# Patient Record
Sex: MALE | Race: WHITE | Employment: FULL TIME | URBAN - NONMETROPOLITAN AREA
[De-identification: names, ages, dates, MRNs, and addresses within clinical notes are randomized per-mention and may not be internally consistent; named-entity substitution may affect disease eponyms.]

---

## 2018-02-28 ENCOUNTER — OFFICE VISIT (OUTPATIENT)
Dept: FAMILY MEDICINE CLINIC | Facility: CLINIC | Age: 53
End: 2018-02-28

## 2018-02-28 VITALS
WEIGHT: 228.38 LBS | TEMPERATURE: 98 F | BODY MASS INDEX: 33.07 KG/M2 | HEIGHT: 69.5 IN | OXYGEN SATURATION: 98 % | HEART RATE: 88 BPM | SYSTOLIC BLOOD PRESSURE: 124 MMHG | DIASTOLIC BLOOD PRESSURE: 88 MMHG

## 2018-02-28 DIAGNOSIS — F41.9 ANXIETY AND DEPRESSION: ICD-10-CM

## 2018-02-28 DIAGNOSIS — I10 ESSENTIAL HYPERTENSION: Primary | ICD-10-CM

## 2018-02-28 DIAGNOSIS — F32.A ANXIETY AND DEPRESSION: ICD-10-CM

## 2018-02-28 PROCEDURE — 99213 OFFICE O/P EST LOW 20 MIN: CPT | Performed by: FAMILY MEDICINE

## 2018-02-28 RX ORDER — LISINOPRIL 10 MG/1
10 TABLET ORAL DAILY
Qty: 90 TABLET | Refills: 3 | Status: SHIPPED | OUTPATIENT
Start: 2018-02-28 | End: 2019-03-27

## 2018-02-28 RX ORDER — ALPRAZOLAM 0.25 MG/1
0.25 TABLET ORAL EVERY 6 HOURS PRN
Qty: 30 TABLET | Refills: 0 | Status: SHIPPED | OUTPATIENT
Start: 2018-02-28 | End: 2019-02-11

## 2018-02-28 NOTE — PROGRESS NOTES
Prasad Webb is a 50year old male. Patient presents with: Other: fup on meds--needs lisinopril refilled--has been out for a day--BP check. ... [facility] room 1      HPI:   Patient lost his insurance and has not been here for close to a year and a half.   He has Comment: 3-4 beers per week max       REVIEW OF SYSTEMS:   GENERAL HEALTH: feels well otherwise  SKIN: denies any unusual skin lesions or rashes  RESPIRATORY: denies shortness of breath   CARDIOVASCULAR: denies chest pain   GI: denies nausea,

## 2018-03-28 ENCOUNTER — NURSE ONLY (OUTPATIENT)
Dept: FAMILY MEDICINE CLINIC | Facility: CLINIC | Age: 53
End: 2018-03-28

## 2018-03-28 ENCOUNTER — TELEPHONE (OUTPATIENT)
Dept: FAMILY MEDICINE CLINIC | Facility: CLINIC | Age: 53
End: 2018-03-28

## 2018-03-28 VITALS — SYSTOLIC BLOOD PRESSURE: 130 MMHG | DIASTOLIC BLOOD PRESSURE: 88 MMHG

## 2018-07-04 ENCOUNTER — HOSPITAL ENCOUNTER (OUTPATIENT)
Age: 53
Discharge: HOME | End: 2018-07-04
Attending: EMERGENCY MEDICINE
Payer: COMMERCIAL

## 2018-07-04 VITALS
HEIGHT: 70 IN | TEMPERATURE: 98 F | SYSTOLIC BLOOD PRESSURE: 134 MMHG | OXYGEN SATURATION: 97 % | HEART RATE: 66 BPM | RESPIRATION RATE: 14 BRPM | WEIGHT: 225 LBS | DIASTOLIC BLOOD PRESSURE: 92 MMHG | BODY MASS INDEX: 32.21 KG/M2

## 2018-07-04 DIAGNOSIS — L23.7 POISON IVY DERMATITIS: Primary | ICD-10-CM

## 2018-07-04 PROCEDURE — 99204 OFFICE O/P NEW MOD 45 MIN: CPT

## 2018-07-04 PROCEDURE — 99213 OFFICE O/P EST LOW 20 MIN: CPT

## 2018-07-04 RX ORDER — PREDNISONE 10 MG/1
TABLET ORAL
Qty: 45 TABLET | Refills: 0 | Status: SHIPPED | OUTPATIENT
Start: 2018-07-04 | End: 2018-07-16 | Stop reason: ALTCHOICE

## 2018-07-04 NOTE — ED INITIAL ASSESSMENT (HPI)
Monday helped a friend clear poison ivy. States since then rash to bilat arms and now the face.  States has never had a reaction in the past.

## 2018-07-04 NOTE — ED PROVIDER NOTES
Patient presents with:  Rash    HPI:     Sanjay Walden is a 50year old male who presents with chief complaint of rash. 2 days ago pt helped a friend clear a lot of poison ivy out from their yard. States he has never had a reaction to it.   Later that e worsen      All results reviewed and discussed with patient. See AVS for detailed discharge instructions.

## 2018-07-16 ENCOUNTER — OFFICE VISIT (OUTPATIENT)
Dept: FAMILY MEDICINE CLINIC | Facility: CLINIC | Age: 53
End: 2018-07-16

## 2018-07-16 VITALS
DIASTOLIC BLOOD PRESSURE: 84 MMHG | TEMPERATURE: 98 F | HEART RATE: 79 BPM | BODY MASS INDEX: 34 KG/M2 | WEIGHT: 239 LBS | SYSTOLIC BLOOD PRESSURE: 136 MMHG | OXYGEN SATURATION: 97 %

## 2018-07-16 DIAGNOSIS — L23.7 POISON IVY: Primary | ICD-10-CM

## 2018-07-16 DIAGNOSIS — R10.33 PERIUMBILICAL ABDOMINAL PAIN: ICD-10-CM

## 2018-07-16 PROCEDURE — 99214 OFFICE O/P EST MOD 30 MIN: CPT | Performed by: FAMILY MEDICINE

## 2018-07-16 RX ORDER — PREDNISONE 20 MG/1
TABLET ORAL
Qty: 18 TABLET | Refills: 0 | Status: SHIPPED | OUTPATIENT
Start: 2018-07-16 | End: 2019-02-05 | Stop reason: ALTCHOICE

## 2018-07-16 NOTE — PROGRESS NOTES
Nat Lau is a 50year old male. Patient presents with: Other: fup from  on 07/04--for poison ivy--took round of prednisone, finished with that now. ...room 2      HPI:   Patient was treated for poison ivy at the urgent care center on July 4.   He Heart Attack Father    • Diabetes Father    • Heart Disorder Father    • Other[other] [OTHER] Father    • Heart Disease Father    • Diabetes Brother    • Hypertension Brother    • Other[other] [OTHER] Brother    • Cancer Brother      CA thyroid   • Diabete Refills for this Visit:  Signed Prescriptions Disp Refills    predniSONE 20 MG Oral Tab 18 tablet 0      Si po TID x 3 days then 1 Po BID x 3 days then 1 po q am x 3 days           Imaging & Consults:  SURGERY - INTERNAL

## 2019-02-05 ENCOUNTER — OFFICE VISIT (OUTPATIENT)
Dept: FAMILY MEDICINE CLINIC | Facility: CLINIC | Age: 54
End: 2019-02-05

## 2019-02-05 VITALS
TEMPERATURE: 98 F | HEART RATE: 74 BPM | HEIGHT: 69.5 IN | DIASTOLIC BLOOD PRESSURE: 88 MMHG | BODY MASS INDEX: 31.27 KG/M2 | SYSTOLIC BLOOD PRESSURE: 128 MMHG | OXYGEN SATURATION: 99 % | WEIGHT: 216 LBS

## 2019-02-05 DIAGNOSIS — B35.4 TINEA CORPORIS: Primary | ICD-10-CM

## 2019-02-05 PROCEDURE — 99213 OFFICE O/P EST LOW 20 MIN: CPT | Performed by: FAMILY MEDICINE

## 2019-02-05 RX ORDER — KETOCONAZOLE 20 MG/G
1 CREAM TOPICAL 2 TIMES DAILY
Qty: 30 G | Refills: 0 | Status: SHIPPED | OUTPATIENT
Start: 2019-02-05 | End: 2020-08-19 | Stop reason: ALTCHOICE

## 2019-02-05 NOTE — PROGRESS NOTES
Link Reynoso is a 48year old male. Patient presents with: Other: LEFT LEG WOUND CHECK INRM . 1       HPI:   Patient developed a ringlike rash to his left anterior lower leg approximately 2 weeks ago. Did not seem to be spreading.   It was not itchy o nausea, vomiting, diarrhea or abdominal pain   NEURO: denies headaches    EXAM:   /88 (BP Location: Right arm, Patient Position: Sitting, Cuff Size: large)   Pulse 74   Temp 97.5 °F (36.4 °C) (Temporal)   Ht 69.5\"   Wt 216 lb   SpO2 99%   BMI 31.44

## 2019-02-11 ENCOUNTER — OFFICE VISIT (OUTPATIENT)
Dept: FAMILY MEDICINE CLINIC | Facility: CLINIC | Age: 54
End: 2019-02-11

## 2019-02-11 VITALS
TEMPERATURE: 97 F | HEIGHT: 69.5 IN | HEART RATE: 60 BPM | RESPIRATION RATE: 12 BRPM | SYSTOLIC BLOOD PRESSURE: 126 MMHG | DIASTOLIC BLOOD PRESSURE: 84 MMHG | WEIGHT: 216 LBS | BODY MASS INDEX: 31.27 KG/M2

## 2019-02-11 DIAGNOSIS — F41.9 ANXIETY: ICD-10-CM

## 2019-02-11 DIAGNOSIS — L30.9 DERMATITIS: ICD-10-CM

## 2019-02-11 DIAGNOSIS — L03.116 CELLULITIS OF LEFT LOWER EXTREMITY: Primary | ICD-10-CM

## 2019-02-11 PROCEDURE — 99213 OFFICE O/P EST LOW 20 MIN: CPT | Performed by: FAMILY MEDICINE

## 2019-02-11 RX ORDER — ALPRAZOLAM 0.25 MG/1
0.25 TABLET ORAL EVERY 6 HOURS PRN
Qty: 30 TABLET | Refills: 0 | Status: SHIPPED | OUTPATIENT
Start: 2019-02-11 | End: 2020-03-17

## 2019-02-11 RX ORDER — SULFAMETHOXAZOLE AND TRIMETHOPRIM 800; 160 MG/1; MG/1
1 TABLET ORAL 2 TIMES DAILY
Qty: 20 TABLET | Refills: 0 | Status: SHIPPED | OUTPATIENT
Start: 2019-02-11 | End: 2019-02-21

## 2019-02-11 RX ORDER — PREDNISONE 20 MG/1
20 TABLET ORAL 2 TIMES DAILY
Qty: 10 TABLET | Refills: 0 | Status: SHIPPED | OUTPATIENT
Start: 2019-02-11 | End: 2019-02-16

## 2019-02-11 NOTE — PATIENT INSTRUCTIONS
COOL COMPRESSES, ICE MASSAGE PRN  AVOID HOT BATHS / SHOWERS, PAT DRY, DON'T RUB, CUT NAILS SHORT TO AVOID SCRATCHING  BENADRYL 25 MG Q6 PRN ITCHING  Prednisone 20 mg twice daily times 5 days  Bactrim DS twice daily times 10 days  Infection control reviewed

## 2019-02-11 NOTE — PROGRESS NOTES
HPI:    Patient ID: Anival Wood is a 48year old male. Patient presents with:  Rash    Patient seen 2/5/19 by Dr. Zully Dykes for rash on his left lower leg  .   This is felt to be due to tinea corporis and he was started on topical ketoconazole 2% cre Infection control reviewed  Call with an update in the next 48-72 hours    Xanax refilled, may use as needed    Yg Martin. DO Artem, FAAFP    No orders of the defined types were placed in this encounter.       Meds This Visit:  Requested Prescriptions

## 2019-02-14 ENCOUNTER — TELEPHONE (OUTPATIENT)
Dept: FAMILY MEDICINE CLINIC | Facility: CLINIC | Age: 54
End: 2019-02-14

## 2019-02-14 NOTE — TELEPHONE ENCOUNTER
Pt calls with an update on his rash----improving, and his cellulitis---healing well, >50% better, no longer open, redness has lessened---looks more bruised now. Advised to finish abx and call if worse and doesn't resolve completely.

## 2019-02-22 ENCOUNTER — TELEPHONE (OUTPATIENT)
Dept: FAMILY MEDICINE CLINIC | Facility: CLINIC | Age: 54
End: 2019-02-22

## 2019-02-22 NOTE — TELEPHONE ENCOUNTER
You saw this patient back on 2/5/19 and prescribed ketoconazole cream.     The patient advised that the cream was for a wound- this didn't help- so he saw Dr Eneida Andrade.  He was put on an antibiotic nad the prednisone was for the rash     The rash isn't any w

## 2019-02-22 NOTE — TELEPHONE ENCOUNTER
JAVON WANTED TO LET DR KNOW THE WOUND HAS HEALED UP PRETTY NICE, BUT THE RASH HE HAD IS STILL ITCHY, HE WAS WONDERING IF THERE WAS SOMETHING ELSE HE CAN TRY?

## 2019-02-26 ENCOUNTER — OFFICE VISIT (OUTPATIENT)
Dept: FAMILY MEDICINE CLINIC | Facility: CLINIC | Age: 54
End: 2019-02-26

## 2019-02-26 VITALS
SYSTOLIC BLOOD PRESSURE: 138 MMHG | HEART RATE: 81 BPM | DIASTOLIC BLOOD PRESSURE: 86 MMHG | WEIGHT: 217.25 LBS | TEMPERATURE: 98 F | BODY MASS INDEX: 32 KG/M2 | OXYGEN SATURATION: 99 %

## 2019-02-26 DIAGNOSIS — B35.4 TINEA CORPORIS: Primary | ICD-10-CM

## 2019-02-26 PROCEDURE — 99213 OFFICE O/P EST LOW 20 MIN: CPT | Performed by: FAMILY MEDICINE

## 2019-02-26 RX ORDER — LISINOPRIL 10 MG/1
10 TABLET ORAL DAILY
COMMUNITY
End: 2019-10-28

## 2019-02-26 RX ORDER — SULFAMETHOXAZOLE AND TRIMETHOPRIM 800; 160 MG/1; MG/1
1 TABLET ORAL 2 TIMES DAILY
Qty: 10 TABLET | Refills: 0 | Status: SHIPPED | OUTPATIENT
Start: 2019-02-26 | End: 2019-03-03

## 2019-02-26 NOTE — PROGRESS NOTES
Fausto Nascimento is a 48year old male. Patient presents with: Other: fup on rash and LT leg wound. ...room 1      HPI:   Patient's rash to the anterior left lower extremity became secondarily infected. Treated with Bactrim.   He responded very well was co denies shortness of breath   CARDIOVASCULAR: denies chest pain   GI: denies nausea, vomiting, diarrhea or abdominal pain   NEURO: denies headaches    EXAM:   /86   Pulse 81   Temp 97.6 °F (36.4 °C) (Tympanic)   Wt 217 lb 4 oz   SpO2 99%   BMI 31.62 k

## 2019-03-27 RX ORDER — LISINOPRIL 10 MG/1
TABLET ORAL
Qty: 90 TABLET | Refills: 3 | Status: SHIPPED | OUTPATIENT
Start: 2019-03-27 | End: 2020-03-17

## 2019-04-08 ENCOUNTER — TELEPHONE (OUTPATIENT)
Dept: FAMILY MEDICINE CLINIC | Facility: CLINIC | Age: 54
End: 2019-04-08

## 2019-04-08 DIAGNOSIS — R21 RASH: Primary | ICD-10-CM

## 2019-04-08 NOTE — TELEPHONE ENCOUNTER
Not sure about insurance.    You have not seen the patient since 2/26/19 for rash on left lower leg     Calling the patient- na/nm

## 2019-04-09 NOTE — TELEPHONE ENCOUNTER
----- Message from Elliot Galarza sent at 4/9/2019  8:54 AM CDT -----  DR Nevaeh Christina RETURNED A CALL ABOUT A DERMATOLOGIST  [phone]

## 2019-04-09 NOTE — TELEPHONE ENCOUNTER
Calling the patient back-   The rash is on both legs. Also on his tricep/bicep area. It does not appear to be contagious as no one in his family has gotten it.  It does itch a lot     Dr Mami Rivas recommended a dermatologist if it persisted  The patient faina

## 2019-04-09 NOTE — TELEPHONE ENCOUNTER
Calling the patient and gave him the information     [phone]  [address] [place] [id number]

## 2019-04-16 ENCOUNTER — OFFICE VISIT (OUTPATIENT)
Dept: DERMATOLOGY | Age: 54
End: 2019-04-16

## 2019-04-16 ENCOUNTER — PATIENT OUTREACH (OUTPATIENT)
Dept: FAMILY MEDICINE CLINIC | Facility: CLINIC | Age: 54
End: 2019-04-16

## 2019-04-16 DIAGNOSIS — L30.9 ECZEMA, UNSPECIFIED TYPE: Primary | ICD-10-CM

## 2019-04-16 PROCEDURE — 99203 OFFICE O/P NEW LOW 30 MIN: CPT | Performed by: DERMATOLOGY

## 2019-04-16 PROCEDURE — 96372 THER/PROPH/DIAG INJ SC/IM: CPT | Performed by: DERMATOLOGY

## 2019-04-16 RX ORDER — TRIAMCINOLONE ACETONIDE 1 MG/G
OINTMENT TOPICAL
Qty: 454 G | Refills: 1 | Status: SHIPPED | OUTPATIENT
Start: 2019-04-16 | End: 2020-03-17 | Stop reason: SDUPTHER

## 2019-04-16 RX ORDER — TRIAMCINOLONE ACETONIDE 40 MG/ML
80 INJECTION, SUSPENSION INTRA-ARTICULAR; INTRAMUSCULAR ONCE
Status: COMPLETED | OUTPATIENT
Start: 2019-04-16 | End: 2019-04-16

## 2019-04-16 RX ADMIN — TRIAMCINOLONE ACETONIDE 80 MG: 40 INJECTION, SUSPENSION INTRA-ARTICULAR; INTRAMUSCULAR at 10:59

## 2019-10-10 ENCOUNTER — TELEPHONE (OUTPATIENT)
Dept: FAMILY MEDICINE CLINIC | Facility: CLINIC | Age: 54
End: 2019-10-10

## 2019-10-10 NOTE — TELEPHONE ENCOUNTER
JAVON HAS A COUSIN THAT HAS SHINGLES, HE IS ASKING ABOUT THE SHOT. HE WOULD LIKE TO KNOW WHAT IT WOULD COST AND HOW EFFECTIVE IT IS. HE DOES HAVE [employer] INSURANCE.

## 2019-10-28 ENCOUNTER — PATIENT OUTREACH (OUTPATIENT)
Dept: FAMILY MEDICINE CLINIC | Facility: CLINIC | Age: 54
End: 2019-10-28

## 2019-10-28 ENCOUNTER — OFFICE VISIT (OUTPATIENT)
Dept: FAMILY MEDICINE CLINIC | Facility: CLINIC | Age: 54
End: 2019-10-28

## 2019-10-28 VITALS
HEIGHT: 69.5 IN | WEIGHT: 227 LBS | TEMPERATURE: 98 F | BODY MASS INDEX: 32.87 KG/M2 | DIASTOLIC BLOOD PRESSURE: 88 MMHG | SYSTOLIC BLOOD PRESSURE: 138 MMHG | HEART RATE: 94 BPM | OXYGEN SATURATION: 95 %

## 2019-10-28 DIAGNOSIS — J03.90 TONSILLITIS: Primary | ICD-10-CM

## 2019-10-28 PROCEDURE — 99213 OFFICE O/P EST LOW 20 MIN: CPT | Performed by: FAMILY MEDICINE

## 2019-10-28 RX ORDER — AZITHROMYCIN 250 MG/1
TABLET, FILM COATED ORAL
Qty: 6 TABLET | Refills: 0 | Status: SHIPPED | OUTPATIENT
Start: 2019-10-28 | End: 2020-08-19 | Stop reason: ALTCHOICE

## 2019-10-28 NOTE — PROGRESS NOTES
Eva Mead is a 48year old male. Patient presents with:  Sore Throat: fever, body aches, st-started on 10/26-has been taking nyquil, tyelnol. ...room 1      HPI:   Patient claims of sore throat. He has had fever and body aches.   He has had a slight well otherwise  SKIN: denies any unusual skin lesions or rashes  RESPIRATORY: denies shortness of breath   CARDIOVASCULAR: denies chest pain   GI: denies nausea, vomiting, diarrhea or abdominal pain   NEURO: denies headaches    EXAM:   /88   Pulse 94

## 2020-03-17 RX ORDER — LISINOPRIL 10 MG/1
10 TABLET ORAL
Qty: 90 TABLET | Refills: 3 | Status: SHIPPED | OUTPATIENT
Start: 2020-03-17 | End: 2021-06-15

## 2020-03-17 RX ORDER — TRIAMCINOLONE ACETONIDE 1 MG/G
OINTMENT TOPICAL
Qty: 454 G | Refills: 1 | Status: SHIPPED | OUTPATIENT
Start: 2020-03-17

## 2020-03-17 RX ORDER — ALPRAZOLAM 0.25 MG/1
0.25 TABLET ORAL EVERY 6 HOURS PRN
Qty: 30 TABLET | Refills: 0 | Status: SHIPPED | OUTPATIENT
Start: 2020-03-17 | End: 2020-09-01

## 2020-03-17 NOTE — TELEPHONE ENCOUNTER
LISINOPRIL 10 MG Oral Tab   ALPRAZolam 0.25 MG Oral Tab     PLEASE SEND REFILLS TO [facility] IN [place]

## 2020-03-17 NOTE — TELEPHONE ENCOUNTER
LOV  10/28/2019    LAST LAB  12/2/2013    LAST RX  Alprazolam  02/11/2019   (30 tabs, 0 refill)  Lisinopril  03/27/2019   (90 tabs, 3 refill)    Next OV  No future appointments.     PROTOCOL     lisinopril 10 MG Oral Tab              Sig: Take 1 tablet (10

## 2020-03-26 RX ORDER — TRIAMCINOLONE ACETONIDE 1 MG/G
OINTMENT TOPICAL
Qty: 454 G | Refills: 1 | OUTPATIENT
Start: 2020-03-26

## 2020-08-19 ENCOUNTER — LAB ENCOUNTER (OUTPATIENT)
Dept: LAB | Age: 55
End: 2020-08-19
Attending: FAMILY MEDICINE
Payer: COMMERCIAL

## 2020-08-19 ENCOUNTER — OFFICE VISIT (OUTPATIENT)
Dept: FAMILY MEDICINE CLINIC | Facility: CLINIC | Age: 55
End: 2020-08-19

## 2020-08-19 VITALS
HEART RATE: 80 BPM | SYSTOLIC BLOOD PRESSURE: 122 MMHG | TEMPERATURE: 98 F | HEIGHT: 69 IN | OXYGEN SATURATION: 96 % | DIASTOLIC BLOOD PRESSURE: 86 MMHG | BODY MASS INDEX: 35.44 KG/M2 | WEIGHT: 239.25 LBS

## 2020-08-19 DIAGNOSIS — I10 ESSENTIAL HYPERTENSION: ICD-10-CM

## 2020-08-19 DIAGNOSIS — R53.83 OTHER FATIGUE: ICD-10-CM

## 2020-08-19 DIAGNOSIS — Z00.00 PREVENTATIVE HEALTH CARE: Primary | ICD-10-CM

## 2020-08-19 DIAGNOSIS — Z12.11 COLON CANCER SCREENING: ICD-10-CM

## 2020-08-19 DIAGNOSIS — K20.90 ESOPHAGITIS: ICD-10-CM

## 2020-08-19 DIAGNOSIS — R13.10 DYSPHAGIA, UNSPECIFIED TYPE: ICD-10-CM

## 2020-08-19 DIAGNOSIS — F41.9 ANXIETY AND DEPRESSION: ICD-10-CM

## 2020-08-19 DIAGNOSIS — M25.50 ARTHRALGIA, UNSPECIFIED JOINT: ICD-10-CM

## 2020-08-19 DIAGNOSIS — R06.00 EXERTIONAL DYSPNEA: ICD-10-CM

## 2020-08-19 DIAGNOSIS — Z12.5 PROSTATE CANCER SCREENING: ICD-10-CM

## 2020-08-19 DIAGNOSIS — F32.A ANXIETY AND DEPRESSION: ICD-10-CM

## 2020-08-19 LAB
ALBUMIN SERPL-MCNC: 4.2 G/DL (ref 3.4–5)
ALBUMIN/GLOB SERPL: 1.1 {RATIO} (ref 1–2)
ALP LIVER SERPL-CCNC: 103 U/L (ref 45–117)
ALT SERPL-CCNC: 52 U/L (ref 16–61)
ANION GAP SERPL CALC-SCNC: 1 MMOL/L (ref 0–18)
AST SERPL-CCNC: 27 U/L (ref 15–37)
BASOPHILS # BLD AUTO: 0.07 X10(3) UL (ref 0–0.2)
BASOPHILS NFR BLD AUTO: 0.9 %
BILIRUB SERPL-MCNC: 0.7 MG/DL (ref 0.1–2)
BUN BLD-MCNC: 17 MG/DL (ref 7–18)
BUN/CREAT SERPL: 12.4 (ref 10–20)
CALCIUM BLD-MCNC: 9.2 MG/DL (ref 8.5–10.1)
CHLORIDE SERPL-SCNC: 105 MMOL/L (ref 98–112)
CHOLEST SMN-MCNC: 174 MG/DL (ref ?–200)
CO2 SERPL-SCNC: 30 MMOL/L (ref 21–32)
COMPLEXED PSA SERPL-MCNC: 0.7 NG/ML (ref ?–4)
CREAT BLD-MCNC: 1.37 MG/DL (ref 0.7–1.3)
DEPRECATED RDW RBC AUTO: 42 FL (ref 35.1–46.3)
EOSINOPHIL # BLD AUTO: 0.29 X10(3) UL (ref 0–0.7)
EOSINOPHIL NFR BLD AUTO: 3.7 %
ERYTHROCYTE [DISTWIDTH] IN BLOOD BY AUTOMATED COUNT: 13 % (ref 11–15)
GLOBULIN PLAS-MCNC: 3.7 G/DL (ref 2.8–4.4)
GLUCOSE BLD-MCNC: 95 MG/DL (ref 70–99)
HCT VFR BLD AUTO: 50.3 % (ref 39–53)
HDLC SERPL-MCNC: 46 MG/DL (ref 40–59)
HGB BLD-MCNC: 16.9 G/DL (ref 13–17.5)
IMM GRANULOCYTES # BLD AUTO: 0.02 X10(3) UL (ref 0–1)
IMM GRANULOCYTES NFR BLD: 0.3 %
LDLC SERPL CALC-MCNC: 84 MG/DL (ref ?–100)
LYMPHOCYTES # BLD AUTO: 1.91 X10(3) UL (ref 1–4)
LYMPHOCYTES NFR BLD AUTO: 24.1 %
M PROTEIN MFR SERPL ELPH: 7.9 G/DL (ref 6.4–8.2)
MCH RBC QN AUTO: 30 PG (ref 26–34)
MCHC RBC AUTO-ENTMCNC: 33.6 G/DL (ref 31–37)
MCV RBC AUTO: 89.2 FL (ref 80–100)
MONOCYTES # BLD AUTO: 1.05 X10(3) UL (ref 0.1–1)
MONOCYTES NFR BLD AUTO: 13.3 %
NEUTROPHILS # BLD AUTO: 4.57 X10 (3) UL (ref 1.5–7.7)
NEUTROPHILS # BLD AUTO: 4.57 X10(3) UL (ref 1.5–7.7)
NEUTROPHILS NFR BLD AUTO: 57.7 %
NONHDLC SERPL-MCNC: 128 MG/DL (ref ?–130)
OSMOLALITY SERPL CALC.SUM OF ELEC: 283 MOSM/KG (ref 275–295)
PLATELET # BLD AUTO: 211 10(3)UL (ref 150–450)
POTASSIUM SERPL-SCNC: 4.6 MMOL/L (ref 3.5–5.1)
RBC # BLD AUTO: 5.64 X10(6)UL (ref 4.3–5.7)
SODIUM SERPL-SCNC: 136 MMOL/L (ref 136–145)
TRIGL SERPL-MCNC: 219 MG/DL (ref 30–149)
TSI SER-ACNC: 3.19 MIU/ML (ref 0.36–3.74)
VLDLC SERPL CALC-MCNC: 44 MG/DL (ref 0–30)
WBC # BLD AUTO: 7.9 X10(3) UL (ref 4–11)

## 2020-08-19 PROCEDURE — 90715 TDAP VACCINE 7 YRS/> IM: CPT | Performed by: FAMILY MEDICINE

## 2020-08-19 PROCEDURE — 3008F BODY MASS INDEX DOCD: CPT | Performed by: FAMILY MEDICINE

## 2020-08-19 PROCEDURE — 80053 COMPREHEN METABOLIC PANEL: CPT

## 2020-08-19 PROCEDURE — G0438 PPPS, INITIAL VISIT: HCPCS | Performed by: FAMILY MEDICINE

## 2020-08-19 PROCEDURE — 3079F DIAST BP 80-89 MM HG: CPT | Performed by: FAMILY MEDICINE

## 2020-08-19 PROCEDURE — 3074F SYST BP LT 130 MM HG: CPT | Performed by: FAMILY MEDICINE

## 2020-08-19 PROCEDURE — 80061 LIPID PANEL: CPT

## 2020-08-19 PROCEDURE — 87476 LYME DIS DNA AMP PROBE: CPT

## 2020-08-19 PROCEDURE — 99396 PREV VISIT EST AGE 40-64: CPT | Performed by: FAMILY MEDICINE

## 2020-08-19 PROCEDURE — 99214 OFFICE O/P EST MOD 30 MIN: CPT | Performed by: FAMILY MEDICINE

## 2020-08-19 PROCEDURE — 36415 COLL VENOUS BLD VENIPUNCTURE: CPT

## 2020-08-19 PROCEDURE — 85025 COMPLETE CBC W/AUTO DIFF WBC: CPT

## 2020-08-19 PROCEDURE — 96127 BRIEF EMOTIONAL/BEHAV ASSMT: CPT | Performed by: FAMILY MEDICINE

## 2020-08-19 PROCEDURE — 90471 IMMUNIZATION ADMIN: CPT | Performed by: FAMILY MEDICINE

## 2020-08-19 PROCEDURE — 84443 ASSAY THYROID STIM HORMONE: CPT

## 2020-08-19 NOTE — H&P
Mitch Choudhury is a 48year old male who presents for a complete physical exam.     Patient presents with:  CPX: annual physical....room 1      HPI:   Patient presents for a routine physical.  He has a list of concerns.   His list includes headaches, chest esophagitis      Family Status   Relation Status   • Mo    • Fa    • Bro    • Bro (Not Specified)   • NEG (Not Specified)          Social History:  Social History    Tobacco Use      Smoking status: Never Smoker      Smokeless tobac dermatology. Blood pressures well controlled. He needs to work on weight loss and abdominal core strengthening which will help his chronic low back pain.   Regarding exertional dyspnea, I would like him to have a stress echocardiogram.  He had a previous

## 2020-08-24 LAB — BORRELIA SPECIES DNA DETECTION: NOT DETECTED

## 2020-09-01 ENCOUNTER — APPOINTMENT (OUTPATIENT)
Dept: LAB | Facility: HOSPITAL | Age: 55
End: 2020-09-01
Attending: FAMILY MEDICINE
Payer: COMMERCIAL

## 2020-09-01 DIAGNOSIS — R06.00 EXERTIONAL DYSPNEA: ICD-10-CM

## 2020-09-02 LAB — SARS-COV-2 RNA RESP QL NAA+PROBE: NOT DETECTED

## 2020-09-02 RX ORDER — ALPRAZOLAM 0.25 MG/1
0.25 TABLET ORAL EVERY 6 HOURS PRN
Qty: 30 TABLET | Refills: 0 | Status: SHIPPED | OUTPATIENT
Start: 2020-09-02 | End: 2021-02-25

## 2020-09-02 NOTE — TELEPHONE ENCOUNTER
LOV: 08/19/20    LAST LAB: 08/19/20    LAST RX: 03/17/20    Next OV:   Future Appointments   Date Time Provider Marietta Wallis   9/3/2020  8:30 AM Ramos Patino MD [facility] SUB GI   9/4/2020  8:00 AM [facility] CARD STRESS ECHO RM 1 ECARD ECHO [facility]

## 2020-09-03 ENCOUNTER — PATIENT MESSAGE (OUTPATIENT)
Dept: ADMINISTRATIVE | Facility: HOSPITAL | Age: 55
End: 2020-09-03

## 2020-09-03 DIAGNOSIS — Z01.818 PRE-OP TESTING: Primary | ICD-10-CM

## 2020-09-03 NOTE — TELEPHONE ENCOUNTER
Pre- Procedure Screening [facility]   10/18/1965   [id number]     Height: 5'10 Weight: 238 BMI:  Not to exceed 45 kg/m2 There is no height or weight on file to calculate BMI.      Travel / Contact Questions Y YES to any of the above questions, notify the provider for further direction. General Questions Y N   Are you currently experiencing any new GI symptoms?  [] [x]     The patient must answer YES to one of the following 2 questions in order to schedule a YES, notify provider and do NOT schedule. Do you have a daily bowel movement? [x] []     If NO, patient will need Miralax for one week prior to procedure. Do you take any pain medications with codeine derivatives?  [] [x]     If YES, patient will ne COVID testing 48-72 hours before their procedure. They will be called by hospital and given appointment time and location for the testing.  They may also call central scheduling at [phone] to schedule the test. They will only receive one phone call fr

## 2020-09-04 ENCOUNTER — HOSPITAL ENCOUNTER (OUTPATIENT)
Dept: CV DIAGNOSTICS | Facility: HOSPITAL | Age: 55
Discharge: HOME | End: 2020-09-04
Attending: FAMILY MEDICINE
Payer: COMMERCIAL

## 2020-09-04 DIAGNOSIS — R06.00 EXERTIONAL DYSPNEA: ICD-10-CM

## 2020-09-04 PROCEDURE — 93350 STRESS TTE ONLY: CPT | Performed by: FAMILY MEDICINE

## 2020-09-04 PROCEDURE — 93017 CV STRESS TEST TRACING ONLY: CPT | Performed by: FAMILY MEDICINE

## 2020-09-04 PROCEDURE — 93018 CV STRESS TEST I&R ONLY: CPT | Performed by: FAMILY MEDICINE

## 2020-10-12 ENCOUNTER — APPOINTMENT (OUTPATIENT)
Dept: LAB | Age: 55
End: 2020-10-12
Attending: INTERNAL MEDICINE
Payer: COMMERCIAL

## 2020-10-12 DIAGNOSIS — Z01.818 PRE-OP TESTING: ICD-10-CM

## 2020-10-14 PROBLEM — K57.30 DIVERTICULOSIS OF COLON: Status: ACTIVE | Noted: 2020-10-14

## 2020-10-14 PROBLEM — K63.5 POLYP OF COLON: Status: ACTIVE | Noted: 2020-10-14

## 2020-10-14 PROBLEM — K64.8 INTERNAL HEMORRHOIDS: Status: ACTIVE | Noted: 2020-10-14

## 2020-10-14 PROBLEM — Z12.11 SPECIAL SCREENING FOR MALIGNANT NEOPLASM OF COLON: Status: ACTIVE | Noted: 2020-10-14

## 2020-10-14 PROCEDURE — 88305 TISSUE EXAM BY PATHOLOGIST: CPT | Performed by: INTERNAL MEDICINE

## 2021-02-22 ENCOUNTER — TELEPHONE (OUTPATIENT)
Dept: FAMILY MEDICINE CLINIC | Facility: CLINIC | Age: 56
End: 2021-02-22

## 2021-02-22 NOTE — TELEPHONE ENCOUNTER
I spoke with patient. He actually would like to see Dr. Nikki Gill as well. He has some things he'd like to discuss.

## 2021-02-25 ENCOUNTER — OFFICE VISIT (OUTPATIENT)
Dept: FAMILY MEDICINE CLINIC | Facility: CLINIC | Age: 56
End: 2021-02-25

## 2021-02-25 VITALS
HEIGHT: 70 IN | RESPIRATION RATE: 16 BRPM | OXYGEN SATURATION: 97 % | TEMPERATURE: 98 F | BODY MASS INDEX: 34.93 KG/M2 | WEIGHT: 244 LBS | DIASTOLIC BLOOD PRESSURE: 88 MMHG | SYSTOLIC BLOOD PRESSURE: 128 MMHG | HEART RATE: 75 BPM

## 2021-02-25 DIAGNOSIS — F41.9 ANXIETY AND DEPRESSION: ICD-10-CM

## 2021-02-25 DIAGNOSIS — R35.0 BENIGN PROSTATIC HYPERPLASIA WITH URINARY FREQUENCY: ICD-10-CM

## 2021-02-25 DIAGNOSIS — F32.A ANXIETY AND DEPRESSION: ICD-10-CM

## 2021-02-25 DIAGNOSIS — R53.83 OTHER FATIGUE: ICD-10-CM

## 2021-02-25 DIAGNOSIS — M79.7 FIBROMYALGIA: Primary | ICD-10-CM

## 2021-02-25 DIAGNOSIS — N40.1 BENIGN PROSTATIC HYPERPLASIA WITH URINARY FREQUENCY: ICD-10-CM

## 2021-02-25 DIAGNOSIS — I10 ESSENTIAL HYPERTENSION: ICD-10-CM

## 2021-02-25 DIAGNOSIS — R10.2 PELVIC PAIN IN MALE: ICD-10-CM

## 2021-02-25 PROCEDURE — 99214 OFFICE O/P EST MOD 30 MIN: CPT | Performed by: FAMILY MEDICINE

## 2021-02-25 PROCEDURE — 3074F SYST BP LT 130 MM HG: CPT | Performed by: FAMILY MEDICINE

## 2021-02-25 PROCEDURE — 3008F BODY MASS INDEX DOCD: CPT | Performed by: FAMILY MEDICINE

## 2021-02-25 PROCEDURE — 3079F DIAST BP 80-89 MM HG: CPT | Performed by: FAMILY MEDICINE

## 2021-02-25 RX ORDER — FLUOXETINE HYDROCHLORIDE 20 MG/1
20 CAPSULE ORAL DAILY
Qty: 90 CAPSULE | Refills: 3 | Status: SHIPPED | OUTPATIENT
Start: 2021-02-25 | End: 2021-07-28 | Stop reason: CLARIF

## 2021-02-25 RX ORDER — ALPRAZOLAM 0.25 MG/1
0.25 TABLET ORAL EVERY 6 HOURS PRN
Qty: 30 TABLET | Refills: 0 | Status: SHIPPED | OUTPATIENT
Start: 2021-02-25 | End: 2021-06-14

## 2021-02-25 NOTE — PROGRESS NOTES
Alvaro Carolina is a 50year old male. Patient presents with:  Blood Pressure: Room 2- BP discuss issues and fatigue      HPI:   Patient's blood pressure was markedly elevated at his last office visit. Here to recheck today.   He also complains of fatigue Mother    • Heart Attack Father    • Diabetes Father    • Diabetes Brother    • Hypertension Brother    • Cancer Brother         CA thyroid   • Diabetes Brother    • Heart Disorder Brother    • Stroke Brother    • Stroke Neg         Social History:  Social fibromyalgia. We will try Prozac 20 mg every morning. Explained it will take 4 weeks to see any effect. If unable to tolerate he will let me know. Hopefully will get to the point where he does not need the alprazolam at all for sleeping.   As it is, he

## 2021-03-04 ENCOUNTER — PATIENT MESSAGE (OUTPATIENT)
Dept: FAMILY MEDICINE CLINIC | Facility: CLINIC | Age: 56
End: 2021-03-04

## 2021-03-05 NOTE — TELEPHONE ENCOUNTER
From: Link Speaker  To: Ellyn Mcclain DO  Sent: 3/5/2021 10:52 AM CST  Subject: Referral Request    Alexy Hines  I sent the counselor the information.  She said she thinks it would be wise to have a brain scan done from all of the years of h

## 2021-03-05 NOTE — TELEPHONE ENCOUNTER
He had an unremarkable MRI of the brain in 2013. Okay to order CT scan of the brain, plain. Please schedule in Maksim Dolan.

## 2021-03-16 ENCOUNTER — HOSPITAL ENCOUNTER (OUTPATIENT)
Dept: CT IMAGING | Age: 56
Discharge: HOME | End: 2021-03-16
Attending: FAMILY MEDICINE
Payer: COMMERCIAL

## 2021-03-16 DIAGNOSIS — R41.0 MENTAL CONFUSION: ICD-10-CM

## 2021-03-16 DIAGNOSIS — M79.7 FIBROMYALGIA: ICD-10-CM

## 2021-03-16 DIAGNOSIS — F41.9 ANXIETY AND DEPRESSION: ICD-10-CM

## 2021-03-16 DIAGNOSIS — F32.A ANXIETY AND DEPRESSION: ICD-10-CM

## 2021-03-16 PROCEDURE — 70450 CT HEAD/BRAIN W/O DYE: CPT | Performed by: FAMILY MEDICINE

## 2021-06-15 RX ORDER — ALPRAZOLAM 0.25 MG/1
0.25 TABLET ORAL EVERY 6 HOURS PRN
Qty: 30 TABLET | Refills: 0 | Status: SHIPPED | OUTPATIENT
Start: 2021-06-15 | End: 2021-09-01

## 2021-06-15 RX ORDER — FLUOXETINE HYDROCHLORIDE 20 MG/1
20 CAPSULE ORAL DAILY
Qty: 90 CAPSULE | Refills: 3 | OUTPATIENT
Start: 2021-06-15

## 2021-06-15 NOTE — TELEPHONE ENCOUNTER
Last refill: 02/25/21  Qty: 30  W/ 0 refills  Last ov: 02/25/21    Requested Prescriptions     Pending Prescriptions Disp Refills   • ALPRAZolam 0.25 MG Oral Tab 30 tablet 0     Sig: Take 1 tablet (0.25 mg total) by mouth every 6 (six) hours as needed for

## 2021-06-15 NOTE — TELEPHONE ENCOUNTER
Last refill: 02/25/21  Qty: 90  W/ 3 refills  Last ov: 02/25/21    Requested Prescriptions     Pending Prescriptions Disp Refills   • FLUoxetine HCl 20 MG Oral Cap 90 capsule 3     Sig: Take 1 capsule (20 mg total) by mouth daily.      No future appointment

## 2021-07-28 ENCOUNTER — PATIENT MESSAGE (OUTPATIENT)
Dept: FAMILY MEDICINE CLINIC | Facility: CLINIC | Age: 56
End: 2021-07-28

## 2021-07-28 ENCOUNTER — APPOINTMENT (OUTPATIENT)
Dept: ULTRASOUND IMAGING | Facility: HOSPITAL | Age: 56
End: 2021-07-28
Attending: EMERGENCY MEDICINE
Payer: COMMERCIAL

## 2021-07-28 ENCOUNTER — HOSPITAL ENCOUNTER (OUTPATIENT)
Facility: HOSPITAL | Age: 56
Setting detail: OBSERVATION
Discharge: HOME | End: 2021-07-29
Attending: EMERGENCY MEDICINE | Admitting: INTERNAL MEDICINE
Payer: COMMERCIAL

## 2021-07-28 ENCOUNTER — APPOINTMENT (OUTPATIENT)
Dept: GENERAL RADIOLOGY | Facility: HOSPITAL | Age: 56
End: 2021-07-28
Attending: EMERGENCY MEDICINE
Payer: COMMERCIAL

## 2021-07-28 ENCOUNTER — HOSPITAL ENCOUNTER (OUTPATIENT)
Age: 56
Discharge: ANOTHER HOSPITAL | End: 2021-07-28
Payer: COMMERCIAL

## 2021-07-28 ENCOUNTER — TELEPHONE (OUTPATIENT)
Dept: FAMILY MEDICINE CLINIC | Facility: CLINIC | Age: 56
End: 2021-07-28

## 2021-07-28 VITALS
SYSTOLIC BLOOD PRESSURE: 123 MMHG | WEIGHT: 235 LBS | DIASTOLIC BLOOD PRESSURE: 91 MMHG | OXYGEN SATURATION: 98 % | RESPIRATION RATE: 18 BRPM | HEART RATE: 94 BPM | BODY MASS INDEX: 33.64 KG/M2 | HEIGHT: 70 IN

## 2021-07-28 DIAGNOSIS — R07.9 CHEST PAIN OF UNCERTAIN ETIOLOGY: Primary | ICD-10-CM

## 2021-07-28 DIAGNOSIS — R07.9 CHEST PAIN, RULE OUT ACUTE MYOCARDIAL INFARCTION: Primary | ICD-10-CM

## 2021-07-28 LAB
ALBUMIN SERPL-MCNC: 3.8 G/DL (ref 3.4–5)
ALBUMIN/GLOB SERPL: 1 {RATIO} (ref 1–2)
ALP LIVER SERPL-CCNC: 116 U/L
ALT SERPL-CCNC: 66 U/L
ANION GAP SERPL CALC-SCNC: 5 MMOL/L (ref 0–18)
APTT PPP: 27 SECONDS (ref 25.4–36.1)
APTT PPP: 40.4 SECONDS (ref 25.4–36.1)
AST SERPL-CCNC: 32 U/L (ref 15–37)
ATRIAL RATE: 80 BPM
ATRIAL RATE: 83 BPM
ATRIAL RATE: 84 BPM
BASOPHILS # BLD AUTO: 0.04 X10(3) UL (ref 0–0.2)
BASOPHILS NFR BLD AUTO: 0.6 %
BILIRUB SERPL-MCNC: 0.7 MG/DL (ref 0.1–2)
BUN BLD-MCNC: 18 MG/DL (ref 7–18)
BUN/CREAT SERPL: 12.9 (ref 10–20)
CALCIUM BLD-MCNC: 8.5 MG/DL (ref 8.5–10.1)
CHLORIDE SERPL-SCNC: 106 MMOL/L (ref 98–112)
CO2 SERPL-SCNC: 26 MMOL/L (ref 21–32)
CREAT BLD-MCNC: 1.4 MG/DL
D-DIMER: 0.28 UG/ML FEU (ref ?–0.53)
DEPRECATED RDW RBC AUTO: 41.1 FL (ref 35.1–46.3)
EOSINOPHIL # BLD AUTO: 0.33 X10(3) UL (ref 0–0.7)
EOSINOPHIL NFR BLD AUTO: 4.8 %
ERYTHROCYTE [DISTWIDTH] IN BLOOD BY AUTOMATED COUNT: 12.6 % (ref 11–15)
GLOBULIN PLAS-MCNC: 3.8 G/DL (ref 2.8–4.4)
GLUCOSE BLD-MCNC: 156 MG/DL (ref 70–99)
HCT VFR BLD AUTO: 46.3 %
HGB BLD-MCNC: 15.7 G/DL
IMM GRANULOCYTES # BLD AUTO: 0.02 X10(3) UL (ref 0–1)
IMM GRANULOCYTES NFR BLD: 0.3 %
LYMPHOCYTES # BLD AUTO: 1.53 X10(3) UL (ref 1–4)
LYMPHOCYTES NFR BLD AUTO: 22.2 %
M PROTEIN MFR SERPL ELPH: 7.6 G/DL (ref 6.4–8.2)
MCH RBC QN AUTO: 30.1 PG (ref 26–34)
MCHC RBC AUTO-ENTMCNC: 33.9 G/DL (ref 31–37)
MCV RBC AUTO: 88.9 FL
MONOCYTES # BLD AUTO: 0.87 X10(3) UL (ref 0.1–1)
MONOCYTES NFR BLD AUTO: 12.6 %
NEUTROPHILS # BLD AUTO: 4.11 X10 (3) UL (ref 1.5–7.7)
NEUTROPHILS # BLD AUTO: 4.11 X10(3) UL (ref 1.5–7.7)
NEUTROPHILS NFR BLD AUTO: 59.5 %
OSMOLALITY SERPL CALC.SUM OF ELEC: 289 MOSM/KG (ref 275–295)
P AXIS: -4 DEGREES
P AXIS: 23 DEGREES
P AXIS: 9 DEGREES
P-R INTERVAL: 166 MS
P-R INTERVAL: 174 MS
P-R INTERVAL: 178 MS
PLATELET # BLD AUTO: 177 10(3)UL (ref 150–450)
POTASSIUM SERPL-SCNC: 3.9 MMOL/L (ref 3.5–5.1)
Q-T INTERVAL: 364 MS
Q-T INTERVAL: 368 MS
Q-T INTERVAL: 372 MS
QRS DURATION: 86 MS
QRS DURATION: 90 MS
QRS DURATION: 90 MS
QTC CALCULATION (BEZET): 427 MS
QTC CALCULATION (BEZET): 429 MS
QTC CALCULATION (BEZET): 434 MS
R AXIS: -18 DEGREES
R AXIS: -25 DEGREES
R AXIS: -25 DEGREES
RBC # BLD AUTO: 5.21 X10(6)UL
SARS-COV-2 RNA RESP QL NAA+PROBE: NOT DETECTED
SODIUM SERPL-SCNC: 137 MMOL/L (ref 136–145)
T AXIS: 10 DEGREES
T AXIS: 13 DEGREES
T AXIS: 17 DEGREES
TROPONIN I SERPL-MCNC: <0.045 NG/ML (ref ?–0.04)
TROPONIN I SERPL-MCNC: <0.045 NG/ML (ref ?–0.04)
VENTRICULAR RATE: 80 BPM
VENTRICULAR RATE: 83 BPM
VENTRICULAR RATE: 84 BPM
WBC # BLD AUTO: 6.9 X10(3) UL (ref 4–11)

## 2021-07-28 PROCEDURE — 71045 X-RAY EXAM CHEST 1 VIEW: CPT | Performed by: EMERGENCY MEDICINE

## 2021-07-28 PROCEDURE — 4A023N7 MEASUREMENT OF CARDIAC SAMPLING AND PRESSURE, LEFT HEART, PERCUTANEOUS APPROACH: ICD-10-PCS | Performed by: INTERNAL MEDICINE

## 2021-07-28 PROCEDURE — B2111ZZ FLUOROSCOPY OF MULTIPLE CORONARY ARTERIES USING LOW OSMOLAR CONTRAST: ICD-10-PCS | Performed by: INTERNAL MEDICINE

## 2021-07-28 PROCEDURE — 99205 OFFICE O/P NEW HI 60 MIN: CPT | Performed by: PHYSICIAN ASSISTANT

## 2021-07-28 PROCEDURE — 99220 INITIAL OBSERVATION CARE,LEVL III: CPT | Performed by: HOSPITALIST

## 2021-07-28 PROCEDURE — 93000 ELECTROCARDIOGRAM COMPLETE: CPT | Performed by: PHYSICIAN ASSISTANT

## 2021-07-28 PROCEDURE — 76700 US EXAM ABDOM COMPLETE: CPT | Performed by: EMERGENCY MEDICINE

## 2021-07-28 RX ORDER — ONDANSETRON 2 MG/ML
4 INJECTION INTRAMUSCULAR; INTRAVENOUS EVERY 6 HOURS PRN
Status: DISCONTINUED | OUTPATIENT
Start: 2021-07-28 | End: 2021-07-29

## 2021-07-28 RX ORDER — ALPRAZOLAM 0.25 MG/1
0.25 TABLET ORAL EVERY 6 HOURS PRN
Status: DISCONTINUED | OUTPATIENT
Start: 2021-07-28 | End: 2021-07-29

## 2021-07-28 RX ORDER — PANTOPRAZOLE SODIUM 20 MG/1
20 TABLET, DELAYED RELEASE ORAL 2 TIMES DAILY
Status: DISCONTINUED | OUTPATIENT
Start: 2021-07-28 | End: 2021-07-29

## 2021-07-28 RX ORDER — PROCHLORPERAZINE EDISYLATE 5 MG/ML
5 INJECTION INTRAMUSCULAR; INTRAVENOUS EVERY 8 HOURS PRN
Status: DISCONTINUED | OUTPATIENT
Start: 2021-07-28 | End: 2021-07-29

## 2021-07-28 RX ORDER — ASPIRIN 325 MG
325 TABLET, DELAYED RELEASE (ENTERIC COATED) ORAL DAILY
Status: DISCONTINUED | OUTPATIENT
Start: 2021-07-29 | End: 2021-07-29

## 2021-07-28 RX ORDER — HEPARIN SODIUM AND DEXTROSE 10000; 5 [USP'U]/100ML; G/100ML
INJECTION INTRAVENOUS CONTINUOUS
Status: DISCONTINUED | OUTPATIENT
Start: 2021-07-28 | End: 2021-07-29

## 2021-07-28 RX ORDER — HEPARIN SODIUM 5000 [USP'U]/ML
5000 INJECTION INTRAVENOUS; SUBCUTANEOUS ONCE
Status: COMPLETED | OUTPATIENT
Start: 2021-07-28 | End: 2021-07-28

## 2021-07-28 RX ORDER — SODIUM CHLORIDE 9 MG/ML
INJECTION, SOLUTION INTRAVENOUS
Status: COMPLETED | OUTPATIENT
Start: 2021-07-29 | End: 2021-07-29

## 2021-07-28 RX ORDER — PANTOPRAZOLE SODIUM 20 MG/1
20 TABLET, DELAYED RELEASE ORAL
COMMUNITY
End: 2021-08-04

## 2021-07-28 RX ORDER — FLUOXETINE HYDROCHLORIDE 20 MG/1
20 CAPSULE ORAL DAILY
Status: DISCONTINUED | OUTPATIENT
Start: 2021-07-28 | End: 2021-07-28

## 2021-07-28 RX ORDER — HEPARIN SODIUM AND DEXTROSE 10000; 5 [USP'U]/100ML; G/100ML
1000 INJECTION INTRAVENOUS ONCE
Status: COMPLETED | OUTPATIENT
Start: 2021-07-28 | End: 2021-07-28

## 2021-07-28 RX ORDER — LISINOPRIL 10 MG/1
10 TABLET ORAL DAILY
Status: DISCONTINUED | OUTPATIENT
Start: 2021-07-29 | End: 2021-07-29

## 2021-07-28 RX ORDER — ASPIRIN 81 MG/1
324 TABLET, CHEWABLE ORAL ONCE
Status: COMPLETED | OUTPATIENT
Start: 2021-07-28 | End: 2021-07-28

## 2021-07-28 RX ORDER — ACETAMINOPHEN 325 MG/1
650 TABLET ORAL EVERY 6 HOURS PRN
Status: DISCONTINUED | OUTPATIENT
Start: 2021-07-28 | End: 2021-07-29

## 2021-07-28 RX ORDER — ENOXAPARIN SODIUM 100 MG/ML
40 INJECTION SUBCUTANEOUS DAILY
Status: CANCELLED | OUTPATIENT
Start: 2021-07-28

## 2021-07-28 NOTE — ED INITIAL ASSESSMENT (HPI)
Pt sts last night at 8:45pm grabbing/chest tightness with radiation to left axilla to the elbow. Denies SOB. Episode lasted 30-45\". Resolved on own. Sts has had several similar episodes in the past 1 month but haven't been as severe.  Today feeling mild ch

## 2021-07-28 NOTE — ED QUICK NOTES
Assuming care of pt from [facility] AT [place] at this time. Spouse with pt. No distress noted. Pt denies any CP at this time.  Pt pending transport to [address]

## 2021-07-28 NOTE — ED QUICK NOTES
Round on pt. Back from [address]. No distress noted. Pt denies any pain. Informed of plan for adm. Pt denies any needs at this time.

## 2021-07-28 NOTE — ED INITIAL ASSESSMENT (HPI)
Patient arrives from [facility]. Chest pain last night, intermittent today. Denies dyspnea. States pain felt constricting to left side of chest. Last night radiated to neck and upper left arm.

## 2021-07-28 NOTE — ED PROVIDER NOTES
Patient Seen in: Immediate [address] [place] [address]      History   Patient presents with:  Chest Pain    Stated Complaint: chest tightness/heart palpitations    HPI/Subjective:   HPI    Patient is a 79-year-old male presenting immediate care center with chest discomf coronary artery disease. Father  at 67 from an MI, 1 sibling had passed away at the age of 48 but had cardiac disease. Another sibling has cardiac disease with stent placement. Review of Systems   Constitutional: Negative. HENT: Negative.     Eyes Pulmonary effort is normal.      Breath sounds: Normal breath sounds. Chest:      Chest wall: No tenderness. Abdominal:      General: Abdomen is flat. Bowel sounds are normal. There is no distension. Palpations: There is no mass.       Tenderness: MDM      Patient presents to immediate care center with the above complaint. Patient having suspicion for of chest discomfort he is to be further worked up.  The patient does have risk factors for cardiac diseases, he had a normal stress test p

## 2021-07-28 NOTE — H&P
MIKEL HOSPITALIST  History and Physical     On license of [facility] Patient Status:  Emergency    10/18/1965 MRN [id number]   Location [address] [facility] Attending Brittaney Low MD   Hosp Day # 0 PCP Sabrina Langston DO     Chief Compl Diabetes Father    • Diabetes Brother    • Hypertension Brother    • Cancer Brother         CA thyroid   • Diabetes Brother    • Heart Disorder Brother    • Stroke Brother    • Stroke Neg         Allergies:   Oxycontin               ITCHING    Comment:Ment or lesions. Psychiatric: Appropriate mood and affect.       Diagnostic Data:      Labs:  Recent Labs   Lab 07/28/21  1426   WBC 6.9   HGB 15.7   MCV 88.9   .0       Recent Labs   Lab 07/28/21  1426   *   BUN 18   CREATSERUM 1.40*   GFRAA 66

## 2021-07-28 NOTE — ED QUICK NOTES
Orders for admission, patient is aware of plan and ready to go upstairs. Any questions, please call ED RN [phone]    Vaccinated?   Type of COVID test sent:Rapid, negative  COVID Suspicion level: Low      Titratable drug(s) infusing:NA  Rate:    LOC at time of

## 2021-07-28 NOTE — ED PROVIDER NOTES
Patient Seen in: [facility] Emergency Department      History   Patient presents with:  Chest Pain Angina    Stated Complaint: chest pain since last night    HPI/Subjective:   HPI    Jenni Paez is a pleasant 68-year-old male coming with complaints of monroe °F (36.7 °C) (Oral)   Resp 20   Ht 177.8 cm (5' 10\")   Wt 104.3 kg   SpO2 93%   BMI 33.00 kg/m²         Physical Exam    Generally the patient is alert and oriented ×3 and appears in no distress  HEENT exam: Normal  Lungs are clear to auscultation bilater as past medical history and family history is at moderate to high risk. He will be admitted for further evaluation with cardiac consultation obtained through the emergency department.   He does receive aspirin here in the emergency department he remains pa

## 2021-07-28 NOTE — CONSULTS
[facility] SERVICES Cardiovascular [place]  Report of Consultation    Laya Rojas Patient Status:  Emergency    10/18/1965 MRN [id number]   Location [address] [facility] Attending Natalie Mcelroy MD   Hosp Day # 0 PCP Maritza Shannon reports that he does not use drugs.     Allergies:    Oxycontin               ITCHING    Comment:Mental side effects  Inderal [Propranolo*        Comment:Felt depression    Medications:  Heparin Sodium (Porcine) 5000 UNIT/ML injection 5,000 Units, 5,000 Uni Cardiomegaly - hypertensive     Fibromyalgia     Anxiety and depression     Special screening for malignant neoplasm of colon     Polyp of colon     Diverticulosis of colon     Internal hemorrhoids     Chest pain, rule out acute myocardial infarction

## 2021-07-28 NOTE — TELEPHONE ENCOUNTER
Patient feels tightness in the chest going on and off. Last night was more severe. Describes location at mid left chest that radiates to arm pit and inner left arm not down to forearm.  Advised patient that he can not wait until a doctor visit and needs to

## 2021-07-29 ENCOUNTER — APPOINTMENT (OUTPATIENT)
Dept: INTERVENTIONAL RADIOLOGY/VASCULAR | Facility: HOSPITAL | Age: 56
End: 2021-07-29
Attending: INTERNAL MEDICINE
Payer: COMMERCIAL

## 2021-07-29 ENCOUNTER — APPOINTMENT (OUTPATIENT)
Dept: CV DIAGNOSTICS | Facility: HOSPITAL | Age: 56
End: 2021-07-29
Attending: INTERNAL MEDICINE
Payer: COMMERCIAL

## 2021-07-29 VITALS
OXYGEN SATURATION: 93 % | RESPIRATION RATE: 18 BRPM | BODY MASS INDEX: 34.02 KG/M2 | TEMPERATURE: 99 F | HEART RATE: 76 BPM | WEIGHT: 237.63 LBS | HEIGHT: 70 IN | SYSTOLIC BLOOD PRESSURE: 132 MMHG | DIASTOLIC BLOOD PRESSURE: 89 MMHG

## 2021-07-29 LAB
ANION GAP SERPL CALC-SCNC: 4 MMOL/L (ref 0–18)
APTT PPP: 52.2 SECONDS (ref 25.4–36.1)
BUN BLD-MCNC: 19 MG/DL (ref 7–18)
BUN/CREAT SERPL: 14.1 (ref 10–20)
CALCIUM BLD-MCNC: 8.3 MG/DL (ref 8.5–10.1)
CHLORIDE SERPL-SCNC: 107 MMOL/L (ref 98–112)
CHOLEST SMN-MCNC: 137 MG/DL (ref ?–200)
CO2 SERPL-SCNC: 27 MMOL/L (ref 21–32)
CREAT BLD-MCNC: 1.35 MG/DL
DEPRECATED RDW RBC AUTO: 42 FL (ref 35.1–46.3)
ERYTHROCYTE [DISTWIDTH] IN BLOOD BY AUTOMATED COUNT: 12.7 % (ref 11–15)
EST. AVERAGE GLUCOSE BLD GHB EST-MCNC: 146 MG/DL (ref 68–126)
GLUCOSE BLD-MCNC: 148 MG/DL (ref 70–99)
HBA1C MFR BLD HPLC: 6.7 % (ref ?–5.7)
HCT VFR BLD AUTO: 46 %
HDLC SERPL-MCNC: 35 MG/DL (ref 40–59)
HGB BLD-MCNC: 15.2 G/DL
LDLC SERPL CALC-MCNC: 60 MG/DL (ref ?–100)
MCH RBC QN AUTO: 29.9 PG (ref 26–34)
MCHC RBC AUTO-ENTMCNC: 33 G/DL (ref 31–37)
MCV RBC AUTO: 90.4 FL
NONHDLC SERPL-MCNC: 102 MG/DL (ref ?–130)
OSMOLALITY SERPL CALC.SUM OF ELEC: 291 MOSM/KG (ref 275–295)
PLATELET # BLD AUTO: 165 10(3)UL (ref 150–450)
PLATELET # BLD AUTO: 165 10(3)UL (ref 150–450)
POTASSIUM SERPL-SCNC: 3.8 MMOL/L (ref 3.5–5.1)
RBC # BLD AUTO: 5.09 X10(6)UL
SODIUM SERPL-SCNC: 138 MMOL/L (ref 136–145)
TRIGL SERPL-MCNC: 266 MG/DL (ref 30–149)
TROPONIN I SERPL-MCNC: <0.045 NG/ML (ref ?–0.04)
TROPONIN I SERPL-MCNC: <0.045 NG/ML (ref ?–0.04)
VLDLC SERPL CALC-MCNC: 39 MG/DL (ref 0–30)
WBC # BLD AUTO: 6.4 X10(3) UL (ref 4–11)

## 2021-07-29 PROCEDURE — 99217 OBSERVATION CARE DISCHARGE: CPT | Performed by: HOSPITALIST

## 2021-07-29 PROCEDURE — 93306 TTE W/DOPPLER COMPLETE: CPT | Performed by: INTERNAL MEDICINE

## 2021-07-29 RX ORDER — LISINOPRIL 20 MG/1
20 TABLET ORAL DAILY
Qty: 30 TABLET | Refills: 0 | Status: SHIPPED | OUTPATIENT
Start: 2021-07-29 | End: 2021-08-24

## 2021-07-29 RX ORDER — NITROGLYCERIN 20 MG/100ML
INJECTION INTRAVENOUS
Status: COMPLETED
Start: 2021-07-29 | End: 2021-07-29

## 2021-07-29 RX ORDER — LIDOCAINE HYDROCHLORIDE 10 MG/ML
INJECTION, SOLUTION EPIDURAL; INFILTRATION; INTRACAUDAL; PERINEURAL
Status: COMPLETED
Start: 2021-07-29 | End: 2021-07-29

## 2021-07-29 RX ORDER — ROSUVASTATIN CALCIUM 5 MG/1
5 TABLET, COATED ORAL NIGHTLY
Qty: 30 TABLET | Refills: 0 | Status: SHIPPED | OUTPATIENT
Start: 2021-07-29 | End: 2021-09-01

## 2021-07-29 RX ORDER — VERAPAMIL HYDROCHLORIDE 2.5 MG/ML
INJECTION, SOLUTION INTRAVENOUS
Status: COMPLETED
Start: 2021-07-29 | End: 2021-07-29

## 2021-07-29 RX ORDER — SODIUM CHLORIDE 9 MG/ML
INJECTION, SOLUTION INTRAVENOUS CONTINUOUS
Status: ACTIVE | OUTPATIENT
Start: 2021-07-29 | End: 2021-07-29

## 2021-07-29 RX ORDER — MIDAZOLAM HYDROCHLORIDE 1 MG/ML
INJECTION INTRAMUSCULAR; INTRAVENOUS
Status: COMPLETED
Start: 2021-07-29 | End: 2021-07-29

## 2021-07-29 RX ORDER — HEPARIN SODIUM 5000 [USP'U]/ML
INJECTION, SOLUTION INTRAVENOUS; SUBCUTANEOUS
Status: COMPLETED
Start: 2021-07-29 | End: 2021-07-29

## 2021-07-29 NOTE — DISCHARGE SUMMARY
Brian Galvan HOSPITALIST  DISCHARGE SUMMARY     Alvaro Ge Patient Status:  Observation    10/18/1965 MRN [id number]   [facility] 8NE-A Attending Bakari Shah, [address] [facility] Day # 0 PCP Flaco Garcia DO     Date of Admission: 2021  Da · None    Consultants:  • Cardiology    Discharge Medication List:     Discharge Medications      START taking these medications      Instructions Prescription details   metFORMIN HCl 500 MG Tabs  Commonly known as: GLUCOPHAGE      Take 1 tablet (500 mg [14-20] 17  BP: (123-147)/() 134/90    Physical Exam:    General: No acute distress. Obese. Respiratory: Clear to auscultation bilaterally. No wheezes. No rhonchi. Cardiovascular: S1, S2. Regular rate and rhythm. No murmurs, rubs or gallops.    Rodriguez Kelley

## 2021-07-29 NOTE — TELEPHONE ENCOUNTER
From: Alphonse Rosario  To: Olivia Chan DO  Sent: 7/28/2021 9:25 PM CDT  Subject: Referral Request    Hello there,  So, I have found my way to [facility] and they are running more tests tomorrow . ...   I have a couple referrals that I have not tended to

## 2021-07-29 NOTE — PROGRESS NOTES
Prelim angio    Non-obstructive CAD - normal LV function    Home today - I'll see him in [place] office in October for follow up.     Needs to work on weight - blood sugar elevated and fatty liver changes - HgbA1c pending    LDL 60   HDL 35    [place]

## 2021-07-29 NOTE — PROGRESS NOTES
Assumed care at [address]. Patient sitting in bed, denies CP, SOB and dizziness. Heparin gtt infusing per ACS protocol. Patient AOx4; NSR on cardiac monitor. Plan for [facility] tomorrow, consent signed and in chart. Call light with in reach, will continue to monitor.

## 2021-07-29 NOTE — ED QUICK NOTES
Confirmed with Aung Thorpe on CTU 8 that room is clean at this time. Neha Tinajero confirms receiving RN is Carla Foley (ext [phone]). Informed ER is in code surge. Transport paged.

## 2021-07-29 NOTE — PROGRESS NOTES
Patient sitting in chair, denies CP, SOB and dizziness. Heparin gtt infusing per order. Patient AOx4; NSR on cardiac monitor; lungs clear bilaterally ,RA, no cough noted; no edema noted. Patient NPO for LHC.  Call light with in reach, will continue to monit

## 2021-07-29 NOTE — PROGRESS NOTES
07/28/21 1950 07/28/21 1952 07/28/21 1953   Vital Signs   Pulse 80 85 85   BP (!) 136/101 (!) 143/95 (!) 147/93   MAP (mmHg) 109 107 106   BP Location Left arm Left arm Left arm   BP Method Automatic Automatic Automatic   Patient Position Lying Sitting

## 2021-07-29 NOTE — PROGRESS NOTES
Patient tele D/C, IV discontinued with catheter intact. Patient denies CP, SOB, dizziness, or palpitations. Patient denies calf tenderness. Patient discharge instructions reviewed with patient and spouse, verbalize understanding.  Patient medication and the

## 2021-07-29 NOTE — PLAN OF CARE
Assumed care of patient approx 2330. Patient alert and oriented x4. Vital signs stable, NSR on telemetry. On room air. Patient reports Tylenol helped with headache. Heparin gtt infusing as ordered.  Call light within reach, patient and family updated on mesfin

## 2021-07-29 NOTE — PROGRESS NOTES
Reviewed with Dr. Tonja Stevens. No issues overnight.  No CP/SOB this am    ECG's with no ischemic change    Echo pending - stress echo in Sept 2020 normal with good exercise tolerance    Mildly hypertensive    Troponins normal     / HDL 35    Abdominal u

## 2021-07-30 ENCOUNTER — PATIENT OUTREACH (OUTPATIENT)
Dept: CASE MANAGEMENT | Age: 56
End: 2021-07-30

## 2021-07-30 DIAGNOSIS — Z02.9 ENCOUNTERS FOR ADMINISTRATIVE PURPOSE: ICD-10-CM

## 2021-07-30 NOTE — PROGRESS NOTES
Initial Post Discharge Follow Up   Discharge Date: 7/29/21  Contact Date: 7/30/2021    Consent Verification:  Assessment Completed With: Patient  HIPAA Verified?   Yes    Discharge Dx:  Chest pain, rule out acute myocardial infarction    Was TCC ordered: [facility]? yes  • (NCM) If a new medication was prescribed:    o Was the new medication’s purpose & side effects reviewed? yes  o Do you have any questions about your new medication?  No  • Did you  your discharge medications when you left the hospita medications with patient,  and orders reviewed and discussed. Any changes or updates to medications and or orders sent to PCP.

## 2021-07-30 NOTE — PROCEDURES
[facility]    PATIENT'S NAME: Gladis Ellington DONELL   ATTENDING PHYSICIAN: Nish Molina. Luz Marina Chin D.O.   OPERATING PHYSICIAN: Venus Quach M.D.    PATIENT ACCOUNT#:   [de-identified]    LOCATION:  [address] [place] [address]  MEDICAL RECORD #:   [id number]       DATE OF BIRTH ARTERIOGRAPHY:  The coronary circulation was right dominant, and a mild amount of vascular calcification was seen fluoroscopically. The left main coronary artery had no angiographic evidence of significant obstructive atherosclerosis.     The left anteri

## 2021-08-02 PROBLEM — R07.9 CHEST PAIN, RULE OUT ACUTE MYOCARDIAL INFARCTION: Status: RESOLVED | Noted: 2021-07-28 | Resolved: 2021-08-02

## 2021-08-02 PROBLEM — Z12.11 SPECIAL SCREENING FOR MALIGNANT NEOPLASM OF COLON: Status: RESOLVED | Noted: 2020-10-14 | Resolved: 2021-08-02

## 2021-08-04 NOTE — PROGRESS NOTES
Mavis Toure is a 50year old male. Patient presents with:  Hospital F/U: fup from [address] [place] [address] for chest pain-sent my[facility]  Dizziness: dizziness that started June 2021-has gotten worse. ...       HPI:   Patient was hospit Attack Father    • Diabetes Father    • Diabetes Brother    • Hypertension Brother    • Cancer Brother         CA thyroid   • Diabetes Brother    • Heart Disorder Brother    • Stroke Brother    • Stroke Neg         Social History:  Social History    Tobacc Needs to work on [employer] and weight loss. Recheck hemoglobin A1c in 6 months. He will need a microalbumin at that time. Continue the rosuvastatin regardless of the cholesterol. No orders of the defined types were placed in this encounter.       Me

## 2021-08-05 LAB
ATRIAL RATE: 67 BPM
P AXIS: 15 DEGREES
P-R INTERVAL: 176 MS
Q-T INTERVAL: 406 MS
QRS DURATION: 92 MS
QTC CALCULATION (BEZET): 429 MS
R AXIS: -17 DEGREES
T AXIS: 8 DEGREES
VENTRICULAR RATE: 67 BPM

## 2021-08-24 RX ORDER — LISINOPRIL 20 MG/1
20 TABLET ORAL DAILY
Qty: 90 TABLET | Refills: 0 | Status: SHIPPED | OUTPATIENT
Start: 2021-08-24 | End: 2021-11-19

## 2021-08-24 NOTE — TELEPHONE ENCOUNTER
Last office visit:  08/04/21   Last refill:  07/29/21  #30-ordering doc:  Britt Roberts while in hospital  Last bmp:  07/29/21      No future visits.

## 2021-09-01 ENCOUNTER — PATIENT MESSAGE (OUTPATIENT)
Dept: FAMILY MEDICINE CLINIC | Facility: CLINIC | Age: 56
End: 2021-09-01

## 2021-09-01 RX ORDER — ALPRAZOLAM 0.25 MG/1
0.25 TABLET ORAL EVERY 6 HOURS PRN
Qty: 30 TABLET | Refills: 0 | Status: SHIPPED | OUTPATIENT
Start: 2021-09-01

## 2021-09-01 RX ORDER — ROSUVASTATIN CALCIUM 5 MG/1
5 TABLET, COATED ORAL NIGHTLY
Qty: 90 TABLET | Refills: 1 | Status: SHIPPED | OUTPATIENT
Start: 2021-09-01 | End: 2022-02-28

## 2021-09-01 NOTE — TELEPHONE ENCOUNTER
LOV: 08/04/21    LAST LAB: 07/29/21    LAST RX: 07/29/21    Next OV:   Future Appointments   Date Time Provider Marietta Kadi   9/15/2021 10:00 AM MD JERRY Henson  [facility]  Cholesterol Medication Protocol [id number]

## 2021-09-01 NOTE — TELEPHONE ENCOUNTER
From: Misael Banegas  To: Sharonda Rubalcava DO  Sent: 9/1/2021 7:36 AM CDT  Subject: Prescription Question    Refill please. ...

## 2021-09-01 NOTE — TELEPHONE ENCOUNTER
From: Fausto Nascimento  To: Verito Christopher DO  Sent: 9/1/2021 7:33 AM CDT  Subject: Prescription Question    This is the bottle from the first script

## 2021-09-01 NOTE — TELEPHONE ENCOUNTER
LOV: 08/04/21    LAST LAB: 07/29/21    LAST RX: 06/15/21    Next OV:   Future Appointments   Date Time Provider Marietta Wallis   9/15/2021 10:00 AM MD JERRY Ortiz       PROTOCOL: n/a

## 2021-09-15 ENCOUNTER — TELEPHONE (OUTPATIENT)
Dept: NEUROLOGY | Facility: CLINIC | Age: 56
End: 2021-09-15

## 2021-09-15 NOTE — TELEPHONE ENCOUNTER
Pt called to confirm appt for tomorrow, however, he was scheduled for today 9/15/21. Pt apologized and rescheduled for 10/13/21 in [place]. Notified [place] office.

## 2021-10-13 NOTE — PROGRESS NOTES
New patient for mental confusion- Patient states he has been experiencing headaches & mental confusion for a few years. Patient states he has headaches consistently all day. Patient states he takes tylenol for headaches which usually helps.  Patient states

## 2021-10-13 NOTE — PROGRESS NOTES
[facility]   Neurology; INITIAL CLINIC VISIT  10/13/2021, 11:05 AM     Gissell Mo Patient Status:  No patient class for patient encounter    10/18/1965 MRN [id number]   Location [address] Attending No att. [place] Nicole MG/5ML Oral Solution, , Disp: , Rfl:   •  pantoprazole 20 MG Oral Tab EC, Take 1 tablet (20 mg total) by mouth 2 (two) times daily before meals. , Disp: 180 tablet, Rfl: 1  •  ALPRAZolam 0.25 MG Oral Tab, Take 1 tablet (0.25 mg total) by mouth every 6 (six) unremarkable. Visualized portions of the orbits are unremarkable. IMPRESSION:   Mild global parenchymal volume loss is noted.  Sequelae of chronic small vessel ischemic disease is noted.  No evidence of intracranial hemorrhage or extra-axial fluid colle

## 2021-11-17 ENCOUNTER — PATIENT MESSAGE (OUTPATIENT)
Dept: FAMILY MEDICINE CLINIC | Facility: CLINIC | Age: 56
End: 2021-11-17

## 2021-11-17 ENCOUNTER — TELEPHONE (OUTPATIENT)
Dept: FAMILY MEDICINE CLINIC | Facility: CLINIC | Age: 56
End: 2021-11-17

## 2021-11-17 DIAGNOSIS — E11.9 TYPE 2 DIABETES MELLITUS WITHOUT COMPLICATION, WITHOUT LONG-TERM CURRENT USE OF INSULIN (HCC): Primary | ICD-10-CM

## 2021-11-17 NOTE — TELEPHONE ENCOUNTER
Yaron Vann would like a prescription for a glucometer and supplies. He has a coupon for the \"contour next\" brand. Ok to check blood sugar once daily? He is not on insulin. Order pending for review.

## 2021-11-17 NOTE — TELEPHONE ENCOUNTER
Script faxed to [facility]. Also attached the coupon that was provided by the pt on [employer].

## 2021-11-17 NOTE — TELEPHONE ENCOUNTER
Called pt and advised to check blood sugar daily while fasting. Should record results for the next 1-2 weeks and bring in results for Dr. Saritha Duarte to review. Verbalized understanding.

## 2021-11-17 NOTE — TELEPHONE ENCOUNTER
PT CAN GET A GLUCOSE METER THRU [facility] FOR FREE, JUST NEED DR TO SEND ORDER OVER, PT SAID THEY ALREADY HAVE IT KEYED INTO THEIR SYSTEM

## 2021-11-19 ENCOUNTER — PATIENT MESSAGE (OUTPATIENT)
Dept: FAMILY MEDICINE CLINIC | Facility: CLINIC | Age: 56
End: 2021-11-19

## 2021-11-19 RX ORDER — LISINOPRIL 20 MG/1
TABLET ORAL
Qty: 90 TABLET | Refills: 0 | Status: SHIPPED | OUTPATIENT
Start: 2021-11-19

## 2021-11-19 NOTE — TELEPHONE ENCOUNTER
Requested Prescriptions     Pending Prescriptions Disp Refills   • LISINOPRIL 20 MG Oral Tab [Pharmacy Med Name: LISINOPRIL 20MG TABLETS] 90 tablet 0     Sig: TAKE 1 TABLET(20 MG) BY MOUTH DAILY     Last refill #90 on 8/24/2021  Last office visit pertainin

## 2021-12-20 RX ORDER — LISINOPRIL 10 MG/1
TABLET ORAL
Qty: 90 TABLET | Refills: 0 | OUTPATIENT
Start: 2021-12-20

## 2021-12-20 NOTE — TELEPHONE ENCOUNTER
Hypertension Medications Protocol Passed 12/17/2021 05:25 PM   Protocol Details  CMP or BMP in past 12 months    Last serum creatinine< 2.0    Appointment in past 6 or next 3 months        Last office visit:  08/04/21  Last refill:  11/19/21  #90, no refi

## 2022-01-10 RX ORDER — ROSUVASTATIN CALCIUM 5 MG/1
5 TABLET, COATED ORAL NIGHTLY
Qty: 90 TABLET | Refills: 1 | OUTPATIENT
Start: 2022-01-10 | End: 2022-07-09

## 2022-01-10 NOTE — TELEPHONE ENCOUNTER
Requested Prescriptions     Pending Prescriptions Disp Refills   • rosuvastatin 5 MG Oral Tab 90 tablet 1     Sig: Take 1 tablet (5 mg total) by mouth nightly. Last refill #90 x 1 on 9/1/2021  Refill requested too soon  Refill denied.

## 2022-01-13 NOTE — PROGRESS NOTES
[facility] with [address]  10/18/1965  Primary Care Provider:  Maggi Nettles DO    1/13/2022  Accompanied visit:     (x) No.      49year old yo patient being seen for:  Jorden effects  Inderal [Propranolo*        Comment:Felt depression         EXAM:  /82 (BP Location: Left arm, Patient Position: Sitting, Cuff Size: large)   Pulse 88   Resp 16   Ht 70\"   Wt 217 lb (98.4 kg)   BMI 31.14 kg/m²   Looks stated age  General Ex --non F2F  (  ) Independent Historian obtained    Non Face to Face CPT code 21609/77545 applies as documented above    PROCEDURE DONE     (   ) see notes      After visit, patient was escorted out and handed-off discharge process and instructions to the ch

## 2022-02-16 NOTE — TELEPHONE ENCOUNTER
LOV: 08/04/21    LAST LAB: 07/29/21    LAST RX: contour kit ordered in Nov 2021, pt needs refill on strips and lancets    Next OV:   Future Appointments   Date Time Provider Marietta Wallis   4/14/2022 11:40 AM MD LUPE CaoOSKAR [facility]       PROTOCOL:   Hypertension Medications Protocol Failed 02/16/2022 09:42 AM   Protocol Details  Appointment in past 6 or next 3 months    CMP or BMP in past 12 months    Last serum creatinine< 2.0     Left detailed msg for pt advising he is due for OV and labs per last OV note.

## 2022-02-17 RX ORDER — LANCETS
EACH MISCELLANEOUS
Qty: 100 EACH | Refills: 0 | Status: SHIPPED | OUTPATIENT
Start: 2022-02-17

## 2022-02-17 RX ORDER — LISINOPRIL 20 MG/1
TABLET ORAL
Qty: 30 TABLET | Refills: 0 | Status: SHIPPED | OUTPATIENT
Start: 2022-02-17 | End: 2022-03-17

## 2022-02-17 RX ORDER — BLOOD SUGAR DIAGNOSTIC
STRIP MISCELLANEOUS
Qty: 100 EACH | Refills: 0 | Status: SHIPPED | OUTPATIENT
Start: 2022-02-17

## 2022-02-17 RX ORDER — LISINOPRIL 20 MG/1
TABLET ORAL
Qty: 90 TABLET | Refills: 0 | OUTPATIENT
Start: 2022-02-17

## 2022-03-17 RX ORDER — LISINOPRIL 20 MG/1
TABLET ORAL
Qty: 30 TABLET | Refills: 0 | Status: SHIPPED | OUTPATIENT
Start: 2022-03-17

## 2022-03-21 ENCOUNTER — TELEPHONE (OUTPATIENT)
Dept: NEUROLOGY | Facility: CLINIC | Age: 57
End: 2022-03-21

## 2022-04-05 RX ORDER — ROSUVASTATIN CALCIUM 5 MG/1
5 TABLET, COATED ORAL NIGHTLY
Qty: 90 TABLET | Refills: 0 | Status: SHIPPED | OUTPATIENT
Start: 2022-04-05

## 2022-04-05 NOTE — TELEPHONE ENCOUNTER
Cholesterol Medication Protocol Passed 04/05/2022 09:03 AM   Protocol Details  ALT < 80    ALT resulted within past year    Lipid panel within past 12 months    Appointment within past 12 or next 3 months         Diabetic Medication Protocol Failed 04/05/2022 09:03 AM   Protocol Details  HgBA1C procedure resulted in past 6 months    Last HgBA1C < 7.5    Appointment in past 6 or next 3 months    Microalbumin procedure in past 12 months or taking ACE/ARB        Last office visit:  08/04/21  Last lipid:  07/29/21  Last a1c:  07/29/21  Last micro: none in epic. Rosuvastatin:  09/01/21  #90, 1 refill  Metformin:  09/01/21  #90, 1 refill    No future appointments-sending mychart to schedule office visit, lab.

## 2022-04-20 RX ORDER — LISINOPRIL 20 MG/1
20 TABLET ORAL DAILY
Qty: 30 TABLET | Refills: 0 | Status: SHIPPED | OUTPATIENT
Start: 2022-04-20

## 2022-04-20 RX ORDER — LISINOPRIL 20 MG/1
TABLET ORAL
Qty: 90 TABLET | Refills: 0 | OUTPATIENT
Start: 2022-04-20

## 2023-03-06 ENCOUNTER — PATIENT OUTREACH (OUTPATIENT)
Dept: FAMILY MEDICINE CLINIC | Facility: CLINIC | Age: 58
End: 2023-03-06

## 2024-02-20 ENCOUNTER — APPOINTMENT (OUTPATIENT)
Dept: GENERAL RADIOLOGY | Age: 59
End: 2024-02-20
Attending: NURSE PRACTITIONER
Payer: OTHER GOVERNMENT

## 2024-02-20 ENCOUNTER — HOSPITAL ENCOUNTER (OUTPATIENT)
Age: 59
Discharge: HOME | End: 2024-02-20
Payer: OTHER GOVERNMENT

## 2024-02-20 VITALS
TEMPERATURE: 98 F | SYSTOLIC BLOOD PRESSURE: 149 MMHG | OXYGEN SATURATION: 94 % | HEART RATE: 91 BPM | RESPIRATION RATE: 20 BRPM | DIASTOLIC BLOOD PRESSURE: 89 MMHG

## 2024-02-20 DIAGNOSIS — M54.2 NECK PAIN: Primary | ICD-10-CM

## 2024-02-20 PROCEDURE — 99203 OFFICE O/P NEW LOW 30 MIN: CPT | Performed by: NURSE PRACTITIONER

## 2024-02-20 PROCEDURE — 72050 X-RAY EXAM NECK SPINE 4/5VWS: CPT | Performed by: NURSE PRACTITIONER

## 2024-02-20 RX ORDER — CYCLOBENZAPRINE HCL 10 MG
10 TABLET ORAL 3 TIMES DAILY PRN
Qty: 20 TABLET | Refills: 0 | Status: SHIPPED | OUTPATIENT
Start: 2024-02-20 | End: 2024-02-27

## 2024-02-20 RX ORDER — ASPIRIN 81 MG/1
81 TABLET, CHEWABLE ORAL DAILY
COMMUNITY

## 2024-02-20 RX ORDER — PREDNISONE 20 MG/1
40 TABLET ORAL DAILY
Qty: 10 TABLET | Refills: 0 | Status: SHIPPED | OUTPATIENT
Start: 2024-02-20 | End: 2024-02-25

## 2024-02-20 NOTE — ED PROVIDER NOTES
Patient Seen in: [facility]      History     Chief Complaint   Patient presents with    Neck Pain     Stated Complaint: neck /shoulder pain x 6 weeks    Subjective:   58-year-old male, presents with pain to the right side of neck that radiates down to his right shoulder on and off for the last few months.  He is a .  Could not think of any specific injury or trauma.  No loss of sensation.  Has been taking Motrin and Tylenol.  Has had this type of pain in the past.            Objective:   Past Medical History:   Diagnosis Date    Alopecia totalis     onset age 14    Anxiety and depression 7/2012    [place] counseling    Essential hypertension     GERD (gastroesophageal reflux disease) 2007    erosive esophagitis by EGD    High blood pressure     HTN (hypertension)               Past Surgical History:   Procedure Laterality Date    EYE SURGERY      lasik    REPAIR UMBILICAL CHANA,5+Y/O,REDUC  2009    UPPER GI ENDOSCOPY,EXAM  2007    erosive esophagitis                Social History     Socioeconomic History    Marital status:     Number of children: 0    Years of education: 12   Occupational History    Occupation:      Employer: [employer]   Tobacco Use    Smoking status: Never    Smokeless tobacco: Never   Vaping Use    Vaping Use: Never used   Substance and Sexual Activity    Alcohol use: Yes     Comment: beer a day     Drug use: No   Other Topics Concern    Caffeine Concern Yes     Comment: 6 cups of coffee daily    Exercise Yes     Comment: varies              Review of Systems   Constitutional: Negative.    Respiratory: Negative.     Cardiovascular: Negative.    Musculoskeletal:  Positive for neck pain.   All other systems reviewed and are negative.      Positive for stated complaint: neck /shoulder pain x 6 weeks  Other systems are as noted in HPI.  Constitutional and vital signs reviewed.      All other systems reviewed and negative except  as noted above.    Physical Exam     ED Triage Vitals [02/20/24 1331]   /89   Pulse 91   Resp 20   Temp 98 °F (36.7 °C)   Temp src Temporal   SpO2 94 %   O2 Device None (Room air)       Current:/89   Pulse 91   Temp 98 °F (36.7 °C) (Temporal)   Resp 20   SpO2 94%         Physical Exam  Vitals and nursing note reviewed.   Constitutional:       General: He is not in acute distress.     Appearance: Normal appearance. He is not ill-appearing, toxic-appearing or diaphoretic.   Cardiovascular:      Rate and Rhythm: Normal rate and regular rhythm.      Pulses: Normal pulses.      Heart sounds: Normal heart sounds.   Pulmonary:      Effort: Pulmonary effort is normal. No respiratory distress.   Musculoskeletal:      Cervical back: Normal range of motion and neck supple. No edema, erythema, signs of trauma, rigidity, torticollis, tenderness or crepitus. Pain with movement present. No spinous process tenderness or muscular tenderness. Normal range of motion.   Lymphadenopathy:      Cervical: No cervical adenopathy.   Neurological:      Mental Status: He is alert.               ED Course   Labs Reviewed - No data to display                   MDM                                         Medical Decision Making  Differential diagnosis initially included but was not limited to: Cervical strain, cervical fracture,    58-year-old male, with pain to right side of neck rating down to his right shoulder.  No spine or bony tenderness.  No neurovascular deficits.  Will obtain x-ray of the cervical spine.  X-ray shows no evidence of acute bony abnormalities or fractures.I personally viewed, independently interpreted and radiology report was reviewed.    Supportive/home management of diagnosis/illness/injury discussed. Red flag symptoms discussed.  Signs and symptoms/criteria that would necessitate reevaluation, including ER evaluation discussed.  Patient and/or responsible adult verbalize and agree with management and plan  of care.    Speech recognition software was used during this dictation.  There may be minor errors in transcription.      Amount and/or Complexity of Data Reviewed  Radiology: ordered and independent interpretation performed. Decision-making details documented in ED Course.    Risk  OTC drugs.  Prescription drug management.        Disposition and Plan     Clinical Impression:  1. Neck pain         Disposition:  Discharge  2/20/2024  2:07 pm    Follow-up:  Sruthi Escamilla MD  [address] [facility] [address]  [phone]    Schedule an appointment as soon as possible for a visit in 1 week            Medications Prescribed:  Current Discharge Medication List        START taking these medications    Details   predniSONE 20 MG Oral Tab Take 2 tablets (40 mg total) by mouth daily for 5 days.  Qty: 10 tablet, Refills: 0      cyclobenzaprine 10 MG Oral Tab Take 1 tablet (10 mg total) by mouth 3 (three) times daily as needed for Muscle spasms.  Qty: 20 tablet, Refills: 0

## 2024-02-20 NOTE — ED INITIAL ASSESSMENT (HPI)
Pt with c/o right side neck pain that radiates into the right shoulder for over 6 weeks.     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 Tends to improve with tylenol/motrin and seeing a chiropractor but none of this has helped this time around.     Denies known injury.

## 2024-02-20 NOTE — DISCHARGE INSTRUCTIONS
Take the prednisone as prescribed.  Over-the-counter Motrin and/or Tylenol as needed for pain.  Heating pad to the affected region 10 minutes at a time, 3-4 times daily until resolution of symptoms.  Take the cyclobenzaprine, which is a muscle relaxer for pain not relieved by Motrin and Tylenol.  Remember it may cause drowsiness.  Follow-up with your doctor.

## 2024-05-09 ENCOUNTER — HOSPITAL ENCOUNTER (OUTPATIENT)
Age: 59
Discharge: HOME | End: 2024-05-09
Payer: OTHER GOVERNMENT

## 2024-05-09 ENCOUNTER — APPOINTMENT (OUTPATIENT)
Dept: GENERAL RADIOLOGY | Age: 59
End: 2024-05-09
Attending: NURSE PRACTITIONER
Payer: OTHER GOVERNMENT

## 2024-05-09 VITALS
HEIGHT: 70 IN | RESPIRATION RATE: 18 BRPM | HEART RATE: 82 BPM | WEIGHT: 230 LBS | TEMPERATURE: 98 F | OXYGEN SATURATION: 97 % | BODY MASS INDEX: 32.93 KG/M2 | SYSTOLIC BLOOD PRESSURE: 149 MMHG | DIASTOLIC BLOOD PRESSURE: 95 MMHG

## 2024-05-09 DIAGNOSIS — M25.571 ACUTE RIGHT ANKLE PAIN: Primary | ICD-10-CM

## 2024-05-09 DIAGNOSIS — S93.401A MILD SPRAIN OF RIGHT ANKLE, INITIAL ENCOUNTER: ICD-10-CM

## 2024-05-09 PROCEDURE — 73610 X-RAY EXAM OF ANKLE: CPT | Performed by: NURSE PRACTITIONER

## 2024-05-09 PROCEDURE — L4350 ANKLE CONTROL ORTHO PRE OTS: HCPCS | Performed by: NURSE PRACTITIONER

## 2024-05-09 PROCEDURE — 99213 OFFICE O/P EST LOW 20 MIN: CPT | Performed by: NURSE PRACTITIONER

## 2024-05-09 RX ORDER — HYDROCODONE BITARTRATE AND ACETAMINOPHEN 5; 325 MG/1; MG/1
1 TABLET ORAL EVERY 6 HOURS PRN
Qty: 15 TABLET | Refills: 0 | Status: SHIPPED | OUTPATIENT
Start: 2024-05-09

## 2024-05-09 NOTE — ED PROVIDER NOTES
Patient Seen in: [facility]      History     Chief Complaint   Patient presents with    Ankle Injury     right     Stated Complaint: Right ankle foot pain fall    Subjective:   58-year-old male, complaining of right ankle pain and swelling post injury yesterday.  States that he was walking down the steps, saw Which he tried to void.  In turn, states he twisted his right ankle.  States he did not fall.  Because of kidney issues, he cannot take NSAIDs such as ibuprofen.  He has been taking Tylenol both last night and this morning.  Did not want anything for pain right now.  No prior surgery to the right foot or ankle.            Objective:   Past Medical History:    Alopecia totalis    onset age 14    Anxiety and depression    [place] counseling    Diabetes (HCC)    Essential hypertension    GERD (gastroesophageal reflux disease)    erosive esophagitis by EGD    High blood pressure    HTN (hypertension)    Hyperlipidemia              Past Surgical History:   Procedure Laterality Date    Eye surgery      lasik    Repair umbilical nicki,5+y/o,reduc  2009    Upper gi endoscopy,exam  2007    erosive esophagitis                Social History     Socioeconomic History    Marital status:     Number of children: 0    Years of education: 12   Occupational History    Occupation:      Employer: [employer]   Tobacco Use    Smoking status: Never    Smokeless tobacco: Never   Vaping Use    Vaping status: Never Used   Substance and Sexual Activity    Alcohol use: Yes     Comment: beer a day     Drug use: No   Other Topics Concern    Caffeine Concern Yes     Comment: 6 cups of coffee daily    Exercise Yes     Comment: varies              Review of Systems   Constitutional: Negative.    Musculoskeletal:         Right ankle pain and swelling.   All other systems reviewed and are negative.      Positive for stated complaint: Right ankle foot pain fall  Other systems are as noted in  HPI.  Constitutional and vital signs reviewed.      All other systems reviewed and negative except as noted above.    Physical Exam     ED Triage Vitals [05/09/24 1317]   BP (!) 149/95   Pulse 82   Resp 18   Temp 98.3 °F (36.8 °C)   Temp src Temporal   SpO2 97 %   O2 Device None (Room air)       Current Vitals:   Vital Signs  BP: (!) 149/95  Pulse: 82  Resp: 18  Temp: 98.3 °F (36.8 °C)  Temp src: Temporal    Oxygen Therapy  SpO2: 97 %  O2 Device: None (Room air)            Physical Exam  Vitals and nursing note reviewed.   Constitutional:       Appearance: Normal appearance. He is not ill-appearing, toxic-appearing or diaphoretic.   Pulmonary:      Effort: No respiratory distress.   Musculoskeletal:      Right lower leg: Normal.      Right ankle: Swelling present. No deformity. Tenderness present over the lateral malleolus. No base of 5th metatarsal or proximal fibula tenderness. Normal range of motion. Normal pulse.      Right Achilles Tendon: Tenderness present.      Right foot: Normal.      Comments: Some tenderness with palpation of the Achilles tendon.  However he has normal plantar and dorsiflexion.  No neurovascular deficits.   Skin:     Capillary Refill: Capillary refill takes less than 2 seconds.      Coloration: Skin is not pale.   Neurological:      General: No focal deficit present.      Mental Status: He is alert.   Psychiatric:         Mood and Affect: Mood normal.               ED Course   Labs Reviewed - No data to display  XR ANKLE (MIN 3 VIEWS), RIGHT (CPT=73610)    Result Date: 5/9/2024  PROCEDURE:  XR ANKLE (MIN 3 VIEWS), RIGHT (CPT=73610)  TECHNIQUE:  Three views were obtained.  COMPARISON:  None.  INDICATIONS:  Right ankle foot pain fall  PATIENT STATED HISTORY: (As transcribed by Technologist)  Patient states he rolled right ankle while walking down stairs last night. Patient has pain to right lateral ankle and Achilles area.            CONCLUSION:  Diffuse soft tissue swelling.  Normal joint  spaces.  No fracture.  Marked vascular calcification. Normal appearing talar dome   LOCATION:  [id number]   Dictated by (CST): Robi Waite MD on 5/09/2024 at 2:32 PM     Finalized by (CST): Robi Waite MD on 5/09/2024 at 2:33 PM                       [facility]                                         Medical Decision Making  Differential diagnosis initially included but was not limited to: Achilles tear, ankle fracture, ankle sprain    Nontoxic 58-year-old male, with right ankle injury last night.  No neurovascular deficits.  Normal range of motion with dorsal and plantarflexion.  Unlikely to be an Achilles tear or rupture.  Swelling and tenderness to the lateral malleolus, will obtain x-ray of the ankle rule out fracture.  I personally viewed, independently interpreted and radiology report was reviewed.  My personal interpretation is no fracture.  Likely ankle sprain.    Supportive/home management of diagnosis/illness/injury discussed. Red flag symptoms discussed.  Signs and symptoms/criteria that would necessitate reevaluation, including ER evaluation discussed.  Patient and/or responsible adult verbalize and agree with management and plan of care.    Speech recognition software was used during this dictation.  There may be minor errors in transcription.          Amount and/or Complexity of Data Reviewed  Radiology: ordered and independent interpretation performed. Decision-making details documented in ED Course.    Risk  OTC drugs.  Prescription drug management.        Disposition and Plan     Clinical Impression:  1. Acute right ankle pain    2. Mild sprain of right ankle, initial encounter         Disposition:  Discharge  5/9/2024  2:36 pm    Follow-up:  Hilario Poole MD  [address] [place] [id number]  [phone]    Schedule an appointment as soon as possible for a visit in 1 week  As needed if no improvement.  Ortho recommendation          Medications Prescribed:  Current Discharge  Medication List        START taking these medications    Details   HYDROcodone-acetaminophen 5-325 MG Oral Tab Take 1 tablet by mouth every 6 (six) hours as needed for Pain.  Qty: 15 tablet, Refills: 0    Associated Diagnoses: Mild sprain of right ankle, initial encounter

## 2024-05-09 NOTE — ED INITIAL ASSESSMENT (HPI)
Pt with pain to the right outer ankle and achilles area. Pt misstepped and twisted ankle last night.

## 2024-05-09 NOTE — DISCHARGE INSTRUCTIONS
Rest, ice, compression, elevation over the next 24 to 48 hours.  Over-the-counter Motrin/Tylenol as needed for pain.  Follow-up with Ortho in 1 week if no improvement.

## 2024-08-16 ENCOUNTER — PATIENT OUTREACH (OUTPATIENT)
Dept: CASE MANAGEMENT | Age: 59
End: 2024-08-16

## 2024-08-16 NOTE — PROCEDURES
The office order for PCP removal request is Approved and finalized on August 16, 2024.    Removed Sruthi Escamilla MD as the patient's Primary Care Physician

## 2025-07-14 ENCOUNTER — HOSPITAL ENCOUNTER (OUTPATIENT)
Age: 60
Discharge: HOME | End: 2025-07-14
Payer: OTHER GOVERNMENT

## 2025-07-14 VITALS
DIASTOLIC BLOOD PRESSURE: 90 MMHG | BODY MASS INDEX: 33 KG/M2 | RESPIRATION RATE: 18 BRPM | OXYGEN SATURATION: 99 % | HEART RATE: 63 BPM | SYSTOLIC BLOOD PRESSURE: 141 MMHG | WEIGHT: 230 LBS | TEMPERATURE: 98 F

## 2025-07-14 DIAGNOSIS — H61.21 IMPACTED CERUMEN OF RIGHT EAR: Primary | ICD-10-CM

## 2025-07-14 RX ORDER — CIPROFLOXACIN AND DEXAMETHASONE 3; 1 MG/ML; MG/ML
4 SUSPENSION/ DROPS AURICULAR (OTIC) 2 TIMES DAILY
Qty: 7.5 ML | Refills: 0 | Status: SHIPPED | OUTPATIENT
Start: 2025-07-14 | End: 2025-07-19

## 2025-07-14 NOTE — ED INITIAL ASSESSMENT (HPI)
Pt with co right ear blockage. Attempted to remove wax on own and made it worse. Denies issues with left ear.

## 2025-07-14 NOTE — ED PROVIDER NOTES
Patient Seen in: [facility]        History  Chief Complaint   Patient presents with    Ear Problem Pain     right     Stated Complaint: Ear pain    Subjective:   59-year-old male, with right ear pain.  States it is either of a piece of cotton stuck in there as he was trying to clean his right ear area over the last week or cerumen impaction.  No fevers.  Denies any other symptoms.                      Objective:     No pertinent past medical history.            No pertinent past surgical history.              No pertinent social history.            Review of Systems   Constitutional: Negative.    HENT:  Positive for ear pain.    All other systems reviewed and are negative.      Positive for stated complaint: Ear pain  Other systems are as noted in HPI.  Constitutional and vital signs reviewed.      All other systems reviewed and negative except as noted above.                  Physical Exam    ED Triage Vitals [07/14/25 1049]   /90   Pulse 63   Resp 18   Temp 97.8 °F (36.6 °C)   Temp src Oral   SpO2 99 %   O2 Device None (Room air)       Current Vitals:   No data recorded          Physical Exam  Vitals and nursing note reviewed.   Constitutional:       General: He is not in acute distress.     Appearance: Normal appearance. He is not ill-appearing, toxic-appearing or diaphoretic.   HENT:      Right Ear: External ear normal. There is impacted cerumen.      Left Ear: Tympanic membrane, ear canal and external ear normal.   Pulmonary:      Effort: No respiratory distress.   Skin:     Coloration: Skin is not jaundiced or pale.   Neurological:      Mental Status: He is alert and oriented to person, place, and time.   Psychiatric:         Behavior: Behavior normal.                 ED Course  Labs Reviewed - No data to display                         MDM             Medical Decision Making  Nontoxic adult male patient with right cerumen impaction.  Reassessed eardrum thereafter, does not look infected.   However will give Ciprodex due to the irrigation, for prophylaxis.  Differential diagnosis also considered but not likely include otitis media, mastoiditis    Wound was irrigated by the PCT using a mixture of water and hydrogen peroxide.    Supportive/home management of diagnosis/illness/injury discussed. Patient and/or responsible adult verbalize and agree with management and plan of care.    Speech recognition software was used during this dictation.  There may be minor errors in transcription.      Risk  Prescription drug management.        Disposition and Plan     Clinical Impression:  1. Impacted cerumen of right ear         Disposition:  Discharge  7/14/2025 11:11 am    Follow-up:  Your primary care provider    Schedule an appointment as soon as possible for a visit             Medications Prescribed:  Discharge Medication List as of 7/14/2025 11:11 AM        START taking these medications    Details   ciprofloxacin-dexamethasone 0.3-0.1 % Otic Suspension Place 4 drops into the right ear 2 (two) times daily for 5 days., Normal, Disp-7.5 mL, R-0                   Supplementary Documentation:

## (undated) NOTE — LETTER
[facility] [address], [address]  2Consent for Procedure/Sedation    Date: 07/28/21   Time: 2100      1.  I authorize the performance upon Vince Kirkpatrick the following:cardiac catheterization, left ventricular cineangiography, bi ____________________________________________________    Signature of person authorized                                     Relationship to  to consent for patient: _________________________ patient: ___________________    Witness: _________________________